# Patient Record
Sex: FEMALE | Race: WHITE | NOT HISPANIC OR LATINO | Employment: FULL TIME | URBAN - METROPOLITAN AREA
[De-identification: names, ages, dates, MRNs, and addresses within clinical notes are randomized per-mention and may not be internally consistent; named-entity substitution may affect disease eponyms.]

---

## 2018-04-09 ENCOUNTER — OFFICE VISIT CONVERTED (OUTPATIENT)
Dept: GASTROENTEROLOGY | Facility: CLINIC | Age: 68
End: 2018-04-09
Attending: NURSE PRACTITIONER

## 2019-03-13 ENCOUNTER — HOSPITAL ENCOUNTER (OUTPATIENT)
Dept: URGENT CARE | Facility: CLINIC | Age: 69
Discharge: HOME | End: 2019-03-13

## 2019-09-30 ENCOUNTER — OFFICE VISIT CONVERTED (OUTPATIENT)
Dept: GASTROENTEROLOGY | Facility: CLINIC | Age: 69
End: 2019-09-30
Attending: NURSE PRACTITIONER

## 2020-02-04 ENCOUNTER — HOSPITAL ENCOUNTER (OUTPATIENT)
Dept: OTHER | Facility: HOSPITAL | Age: 70
Discharge: HOME | End: 2020-02-04
Attending: SPECIALIST

## 2020-02-04 LAB
ALBUMIN SERPL-MCNC: 4.4 G/DL (ref 3.5–5)
ALBUMIN/GLOB SERPL: 1.5 {RATIO} (ref 1.4–2.6)
ALP SERPL-CCNC: 61 U/L (ref 43–160)
ALT SERPL-CCNC: 13 U/L (ref 10–40)
ANION GAP SERPL CALC-SCNC: 23 MMOL/L (ref 8–19)
AST SERPL-CCNC: 19 U/L (ref 15–50)
BILIRUB SERPL-MCNC: 0.32 MG/DL (ref 0.2–1.3)
BUN SERPL-MCNC: 18 MG/DL (ref 5–25)
BUN/CREAT SERPL: 20 {RATIO} (ref 6–20)
CALCIUM SERPL-MCNC: 9.8 MG/DL (ref 8.7–10.4)
CHLORIDE SERPL-SCNC: 101 MMOL/L (ref 99–111)
CHOLEST SERPL-MCNC: 138 MG/DL (ref 107–200)
CHOLEST/HDLC SERPL: 2.5 {RATIO} (ref 3–6)
CONV CO2: 22 MMOL/L (ref 22–32)
CONV TOTAL PROTEIN: 7.3 G/DL (ref 6.3–8.2)
CREAT UR-MCNC: 0.88 MG/DL (ref 0.5–0.9)
GFR SERPLBLD BASED ON 1.73 SQ M-ARVRAT: >60 ML/MIN/{1.73_M2}
GLOBULIN UR ELPH-MCNC: 2.9 G/DL (ref 2–3.5)
GLUCOSE SERPL-MCNC: 91 MG/DL (ref 65–99)
HDLC SERPL-MCNC: 55 MG/DL (ref 40–60)
LDLC SERPL CALC-MCNC: 67 MG/DL (ref 70–100)
OSMOLALITY SERPL CALC.SUM OF ELEC: 295 MOSM/KG (ref 273–304)
POTASSIUM SERPL-SCNC: 4.1 MMOL/L (ref 3.5–5.3)
SODIUM SERPL-SCNC: 142 MMOL/L (ref 135–147)
TRIGL SERPL-MCNC: 80 MG/DL (ref 40–150)
VLDLC SERPL-MCNC: 16 MG/DL (ref 5–37)

## 2020-02-05 ENCOUNTER — HOSPITAL ENCOUNTER (OUTPATIENT)
Dept: URGENT CARE | Facility: CLINIC | Age: 70
Discharge: HOME | End: 2020-02-05

## 2020-10-19 ENCOUNTER — OFFICE VISIT CONVERTED (OUTPATIENT)
Dept: GASTROENTEROLOGY | Facility: CLINIC | Age: 70
End: 2020-10-19
Attending: NURSE PRACTITIONER

## 2020-10-20 ENCOUNTER — HOSPITAL ENCOUNTER (OUTPATIENT)
Dept: OTHER | Facility: HOSPITAL | Age: 70
Discharge: HOME | End: 2020-10-20
Attending: NURSE PRACTITIONER

## 2020-10-20 LAB
C DIFF TOX B STL QL CT TISS CULT: NEGATIVE
CONV 027 TOXIN: NEGATIVE

## 2020-10-22 LAB — BACTERIA SPEC AEROBE CULT: NORMAL

## 2021-05-13 NOTE — PROGRESS NOTES
Progress Note      Patient Name: Dorie Head   Patient ID: [id number]   Sex: Female   YOB: 1950    Primary Care Provider: Kelly LEIVA   Referring Provider: Kelly LEIVA    Visit Date: October 19, 2020    Provider: ANNE Tolentino   Location: [facility]   Location Address: [address] [place] [address], [address]  [place], [place]  [id number]   Location Phone: [phone]          Chief Complaint  · Follow Up Diarrhea      History Of Present Illness     Ms. Head presents for f/u of diarrhrea and collagenous colitis.      She was last evaluated in September, 2019 and prescribed budesonide taper.  She reports that budesonide taper was effective for diarrhea.  However, after she completed taper diarrhea returned approximately 2 weeks later.    She states that watery diarrhea has been severe for the past 1 month.  She admits nausea and weakness and states that she is often missing work due to weakness.  Reports up to 10 watery bowel movements per day.  Denies rectal bleeding.  Admits intermittent abdominal pain that typically only last for a few minutes.               Past Medical History  Basal Cell Carcinoma; Cancer; COPD; High blood pressure; High cholesterol; Squamous cell carcinoma         Past Surgical History  Colonoscopy; Fallopian tube dilation; Hysterectomy; Mole Removal; Tubal ligation         Medication List  amlodipine 10 mg oral tablet; aspirin 81 mg oral tablet,delayed release (DR/EC); carvedilol 6.25 mg oral tablet; hydrochlorothiazide 25 mg oral tablet; lisinopril 40 mg oral tablet; pravastatin 40 mg oral tablet; Zetia 10 mg oral tablet         Allergy List  NO KNOWN DRUG ALLERGIES       Allergies Reconciled  Family Medical History  Diabetes, unspecified type; Ovarian Cancer, Family History         Social History  Alcohol (Never); Caffeine (Current every day); Tobacco (Current every day)         Review of  "Systems  · Constitutional  o Denies  o : chills, fever  · Cardiovascular  o Denies  o : chest pain, irregular heart beats  · Respiratory  o Denies  o : cough, shortness of breath  · Gastrointestinal  o Admits  o : see HPI   · Endocrine  o Denies  o : weight gain, weight loss      Vitals  Date Time BP Position Site L\R Cuff Size HR RR TEMP (F) WT  HT  BMI kg/m2 BSA m2 O2 Sat FR L/min FiO2 HC       10/19/2020 09:09 /74 Sitting      98.4 111lbs 16oz 5'  9\" 16.54 1.57             Physical Examination  · Constitutional  o Appearance  o : thin, well developed, no obvious deformities present  · Head and Face  o Head  o : Normocephalic with no worriesome skin lesions  · Eyes  o Vision  o :   § Visual Fields  § : eyes move symmetrical in all directions  o Sclerae  o : sclerae anicteric  o Pupils and Irises  o : pupils equal and symmetrical  · Neck  o Inspection/Palpation  o : Trachea is midline, no adenopathy  · Respiratory  o Respiratory Effort  o : Breathing is unlabored.  o Inspection of Chest  o : normal appearance  o Auscultation of Lungs  o : Chest is clear to auscultation bilaterally.  · Cardiovascular  o Heart  o :   § Auscultation of Heart  § : no murmurs, rubs, or gallops  o Peripheral Vascular System  o :   § Extremities  § : no cyanosis, clubbing or edema;   · Gastrointestinal  o Abdominal Examination  o : Abdomen is soft, nontender to palpation, with normal active bowel sounds, no appreciable hepatosplenomegaly.  o Digital Rectal Exam  o : deferred  · Skin and Subcutaneous Tissue  o General Inspection  o : without focal lesions; turgor is normal  · Psychiatric  o General  o : Alert and oriented x3  o Mood and Affect  o : Mood and affect are appropriate to circumstances  · Extremities  o Extremities  o : No edema, no cyanosis          Assessment  · Diarrhea     787.91/R19.7  · Nausea     787.02/R11.0  · Collagenous colitis     558.9/K52.831      Plan  · Orders  o C difficile Toxigenic Assay (PCR) [facility] " (93348) - - 10/19/2020  o Lactoferrin (Fecal) Qualitative (63263) - - 10/19/2020  o Stool culture and sensitivity (90486) - - 10/19/2020  · Medications  o ondansetron 8 mg oral tablet,disintegrating   SIG: dissolve 1 tablet by oral route every 6 hours as needed nausea   DISP: (20) Tablet with 0 refills  Prescribed on 10/19/2020     o Medications have been Reconciled  o Transition of Care or Provider Policy  · Instructions  o Information given on current diagnoses. Await stool studies. If c diff negative, will treat with budesonide taper.   · Disposition  o Follow up 2 months            Electronically Signed by: ANNE Tolentino -Author on October 19, 2020 10:33:39 AM

## 2021-05-14 VITALS
WEIGHT: 112 LBS | SYSTOLIC BLOOD PRESSURE: 113 MMHG | HEIGHT: 69 IN | TEMPERATURE: 98.4 F | BODY MASS INDEX: 16.59 KG/M2 | DIASTOLIC BLOOD PRESSURE: 74 MMHG

## 2021-05-15 VITALS
WEIGHT: 116.37 LBS | SYSTOLIC BLOOD PRESSURE: 144 MMHG | BODY MASS INDEX: 17.23 KG/M2 | HEIGHT: 69 IN | DIASTOLIC BLOOD PRESSURE: 52 MMHG

## 2021-05-16 VITALS
BODY MASS INDEX: 17.77 KG/M2 | HEIGHT: 69 IN | SYSTOLIC BLOOD PRESSURE: 143 MMHG | WEIGHT: 120 LBS | DIASTOLIC BLOOD PRESSURE: 61 MMHG

## 2021-07-14 ENCOUNTER — TRANSCRIBE ORDERS (OUTPATIENT)
Dept: ADMINISTRATIVE | Facility: HOSPITAL | Age: 71
End: 2021-07-14

## 2021-07-14 ENCOUNTER — LAB (OUTPATIENT)
Dept: LAB | Facility: HOSPITAL | Age: 71
End: 2021-07-14

## 2021-07-14 DIAGNOSIS — I10 HYPERTENSION, UNSPECIFIED TYPE: Primary | ICD-10-CM

## 2021-07-14 DIAGNOSIS — E78.5 HYPERLIPIDEMIA, UNSPECIFIED HYPERLIPIDEMIA TYPE: ICD-10-CM

## 2021-07-14 DIAGNOSIS — I10 HYPERTENSION, UNSPECIFIED TYPE: ICD-10-CM

## 2021-07-14 LAB
ALBUMIN SERPL-MCNC: 4.3 G/DL (ref 3.5–5.2)
ALBUMIN/GLOB SERPL: 1.4 G/DL
ALP SERPL-CCNC: 73 U/L (ref 39–117)
ALT SERPL W P-5'-P-CCNC: 12 U/L (ref 1–33)
ANION GAP SERPL CALCULATED.3IONS-SCNC: 12 MMOL/L (ref 5–15)
AST SERPL-CCNC: 23 U/L (ref 1–32)
BILIRUB SERPL-MCNC: 0.3 MG/DL (ref 0–1.2)
BUN SERPL-MCNC: 19 MG/DL (ref 8–23)
BUN/CREAT SERPL: 19 (ref 7–25)
CALCIUM SPEC-SCNC: 9.7 MG/DL (ref 8.6–10.5)
CHLORIDE SERPL-SCNC: 100 MMOL/L (ref 98–107)
CHOLEST SERPL-MCNC: 143 MG/DL (ref 0–200)
CO2 SERPL-SCNC: 25 MMOL/L (ref 22–29)
CREAT SERPL-MCNC: 1 MG/DL (ref 0.57–1)
GFR SERPL CREATININE-BSD FRML MDRD: 55 ML/MIN/1.73
GLOBULIN UR ELPH-MCNC: 3 GM/DL
GLUCOSE SERPL-MCNC: 77 MG/DL (ref 65–99)
HDLC SERPL-MCNC: 51 MG/DL (ref 40–60)
LDLC SERPL CALC-MCNC: 74 MG/DL (ref 0–100)
LDLC/HDLC SERPL: 1.42 {RATIO}
POTASSIUM SERPL-SCNC: 4 MMOL/L (ref 3.5–5.2)
PROT SERPL-MCNC: 7.3 G/DL (ref 6–8.5)
SODIUM SERPL-SCNC: 137 MMOL/L (ref 136–145)
TRIGL SERPL-MCNC: 97 MG/DL (ref 0–150)
VLDLC SERPL-MCNC: 18 MG/DL (ref 5–40)

## 2021-07-14 PROCEDURE — 80053 COMPREHEN METABOLIC PANEL: CPT

## 2021-07-14 PROCEDURE — 80061 LIPID PANEL: CPT

## 2021-07-14 PROCEDURE — 36415 COLL VENOUS BLD VENIPUNCTURE: CPT

## 2021-10-18 ENCOUNTER — OFFICE VISIT (OUTPATIENT)
Dept: GASTROENTEROLOGY | Facility: CLINIC | Age: 71
End: 2021-10-18

## 2021-10-18 VITALS
BODY MASS INDEX: 17.51 KG/M2 | WEIGHT: 118.2 LBS | SYSTOLIC BLOOD PRESSURE: 161 MMHG | DIASTOLIC BLOOD PRESSURE: 67 MMHG | HEART RATE: 74 BPM | HEIGHT: 69 IN

## 2021-10-18 DIAGNOSIS — R19.7 DIARRHEA, UNSPECIFIED TYPE: ICD-10-CM

## 2021-10-18 DIAGNOSIS — R11.0 NAUSEA: ICD-10-CM

## 2021-10-18 DIAGNOSIS — K52.831 COLLAGENOUS COLITIS: Primary | ICD-10-CM

## 2021-10-18 PROBLEM — I10 HIGH BLOOD PRESSURE: Status: ACTIVE | Noted: 2021-10-18

## 2021-10-18 PROBLEM — E78.00 HIGH CHOLESTEROL: Status: ACTIVE | Noted: 2021-10-18

## 2021-10-18 PROCEDURE — 99212 OFFICE O/P EST SF 10 MIN: CPT | Performed by: NURSE PRACTITIONER

## 2021-10-18 RX ORDER — AMLODIPINE BESYLATE 10 MG/1
TABLET ORAL
COMMUNITY
Start: 2021-09-13 | End: 2022-02-02 | Stop reason: HOSPADM

## 2021-10-18 RX ORDER — EZETIMIBE 10 MG/1
TABLET ORAL
COMMUNITY
Start: 2021-09-13 | End: 2023-03-14 | Stop reason: SDUPTHER

## 2021-10-18 RX ORDER — LISINOPRIL 40 MG/1
40 TABLET ORAL DAILY
COMMUNITY
End: 2022-02-02 | Stop reason: HOSPADM

## 2021-10-18 RX ORDER — BUDESONIDE 3 MG/1
CAPSULE, COATED PELLETS ORAL
Qty: 180 CAPSULE | Refills: 5 | Status: SHIPPED | OUTPATIENT
Start: 2021-10-18 | End: 2022-01-16

## 2021-10-18 RX ORDER — ASPIRIN 81 MG/1
81 TABLET ORAL DAILY
COMMUNITY
End: 2023-03-14 | Stop reason: SDUPTHER

## 2021-10-18 RX ORDER — CARVEDILOL 12.5 MG/1
TABLET ORAL
COMMUNITY
Start: 2021-09-13 | End: 2022-02-02 | Stop reason: HOSPADM

## 2021-10-18 RX ORDER — PRAVASTATIN SODIUM 40 MG
40 TABLET ORAL DAILY
COMMUNITY
End: 2023-03-14 | Stop reason: SDUPTHER

## 2021-10-18 RX ORDER — HYDROCHLOROTHIAZIDE 25 MG/1
25 TABLET ORAL DAILY
COMMUNITY
End: 2022-02-02 | Stop reason: HOSPADM

## 2021-10-18 NOTE — PROGRESS NOTES
Chief Complaint  colitis follow up    Dorie Head is a 70 y.o. female who presents to [facility] for follow-up of collagenous colitis, diarrhea and nausea.      History of present Illness  States for the past one year, she's felt really sick.  When she has a bowel movement, feels nauseous before bowel movement.  States that she will often have mucous in stools.  If she goes down to one budesonide, has watery stools.      Admits nausea that's worse in the mornings.  This can also occur sporadically throughout the day.  When present, it lasts for about 1-2 hours.  She's unable to stand due to nausea.  Denies vomiting.      Denies heartburn and dysphagia.          Past Medical History:   Diagnosis Date   • Basal cell carcinoma    • Cancer (HCC)    • Cancer (HCC)     squamos cell carcinoma   • COPD (chronic obstructive pulmonary disease) (HCC)    • High blood pressure    • High cholesterol        Past Surgical History:   Procedure Laterality Date   • COLONOSCOPY  2017   • HYSTERECTOMY     • MOLE REMOVAL     • OTHER SURGICAL HISTORY      fallopian tube dilation   • TUBAL ABDOMINAL LIGATION           Current Outpatient Medications:   •  amLODIPine (NORVASC) 10 MG tablet, , Disp: , Rfl:   •  aspirin (aspirin) 81 MG EC tablet, aspirin 81 mg oral tablet,delayed release (DR/EC) take 1 tablet (81 mg) by oral route once daily   Active, Disp: , Rfl:   •  carvedilol (COREG) 12.5 MG tablet, , Disp: , Rfl:   •  Cholecalciferol 50 MCG (2000 UT) capsule, Vitamin D3 2,000 unit oral capsule take 1 capsule by oral route daily   Suspended, Disp: , Rfl:   •  ezetimibe (ZETIA) 10 MG tablet, , Disp: , Rfl:   •  hydroCHLOROthiazide (HYDRODIURIL) 25 MG tablet, hydrochlorothiazide 25 mg oral tablet take 1 tablet (25 mg) by oral route once daily   Active, Disp: , Rfl:   •  lisinopril (PRINIVIL,ZESTRIL) 40 MG tablet, lisinopril 40 mg oral tablet take 1 tablet (40 mg) by oral route once daily   Active, Disp: ,  "Rfl:   •  pravastatin (PRAVACHOL) 40 MG tablet, pravastatin 40 mg oral tablet take 1 tablet (40 mg) by oral route once daily   Active, Disp: , Rfl:   •  Budesonide (Entocort EC) 3 MG 24 hr capsule, Take 3 capsules by mouth Every Morning for 30 days, THEN 2 capsules Every Morning for 30 days, THEN 1 capsule Every Morning for 30 days., Disp: 180 capsule, Rfl: 5     No Known Allergies    Family History   Problem Relation Age of Onset   • Diabetes Mother    • Ovarian cancer Sister         Social History     Social History Narrative   • Not on file       Objective     Review of Systems   Constitutional: Negative for fever and unexpected weight loss.   Respiratory: Negative for cough and shortness of breath.    Cardiovascular: Negative for chest pain and palpitations.   Gastrointestinal:        See HPI   Neurological: Negative for weakness and confusion.        Vital Signs:   /67 (BP Location: Left arm, Patient Position: Sitting, Cuff Size: Adult)   Pulse 74   Ht 175.3 cm (69\")   Wt 53.6 kg (118 lb 3.2 oz)   BMI 17.46 kg/m²       Physical Exam  Constitutional:       General: She is not in acute distress.     Appearance: Normal appearance. She is well-developed and normal weight.   HENT:      Head: Normocephalic and atraumatic.   Eyes:      Conjunctiva/sclera: Conjunctivae normal.      Pupils: Pupils are equal, round, and reactive to light.      Visual Fields: Right eye visual fields normal and left eye visual fields normal.   Cardiovascular:      Rate and Rhythm: Normal rate and regular rhythm.      Heart sounds: Normal heart sounds.   Pulmonary:      Effort: Pulmonary effort is normal. No retractions.      Breath sounds: Normal breath sounds and air entry.   Abdominal:      General: Bowel sounds are normal.      Palpations: Abdomen is soft.      Tenderness: There is no abdominal tenderness.      Comments: No appreciable hepatosplenomegaly or ascites   Musculoskeletal:         General: Normal range of motion.    "   Cervical back: Neck supple.      Right lower leg: No edema.      Left lower leg: No edema.   Lymphadenopathy:      Cervical: No cervical adenopathy.   Skin:     General: Skin is warm and dry.      Findings: No lesion.   Neurological:      General: No focal deficit present.      Mental Status: She is alert and oriented to person, place, and time.   Psychiatric:         Mood and Affect: Mood and affect normal.         Behavior: Behavior normal.         Result Review :                   Assessment and Plan    Diagnoses and all orders for this visit:    1. Collagenous colitis (Primary)  -     Budesonide (Entocort EC) 3 MG 24 hr capsule; Take 3 capsules by mouth Every Morning for 30 days, THEN 2 capsules Every Morning for 30 days, THEN 1 capsule Every Morning for 30 days.  Dispense: 180 capsule; Refill: 5    2. Diarrhea, unspecified type    3. Nausea            Follow Up   Return in about 6 months (around 4/18/2022) for collagenous colitis.  Patient was given instructions and counseling regarding her condition or for health maintenance advice. Please see specific information pulled into the AVS if appropriate.

## 2022-01-31 LAB
ALBUMIN SERPL-MCNC: 4.3 G/DL (ref 3.5–5.2)
ALBUMIN/GLOB SERPL: 1.4 G/DL
ALP SERPL-CCNC: 68 U/L (ref 39–117)
ALT SERPL W P-5'-P-CCNC: 7 U/L (ref 1–33)
ANION GAP SERPL CALCULATED.3IONS-SCNC: 11.6 MMOL/L (ref 5–15)
AST SERPL-CCNC: 12 U/L (ref 1–32)
BASOPHILS # BLD AUTO: 0.05 10*3/MM3 (ref 0–0.2)
BASOPHILS NFR BLD AUTO: 0.7 % (ref 0–1.5)
BILIRUB SERPL-MCNC: 0.4 MG/DL (ref 0–1.2)
BUN SERPL-MCNC: 20 MG/DL (ref 8–23)
BUN/CREAT SERPL: 17.9 (ref 7–25)
CALCIUM SPEC-SCNC: 9.8 MG/DL (ref 8.6–10.5)
CHLORIDE SERPL-SCNC: 95 MMOL/L (ref 98–107)
CO2 SERPL-SCNC: 28.4 MMOL/L (ref 22–29)
CREAT SERPL-MCNC: 1.12 MG/DL (ref 0.57–1)
DEPRECATED RDW RBC AUTO: 41.5 FL (ref 37–54)
EOSINOPHIL # BLD AUTO: 0.23 10*3/MM3 (ref 0–0.4)
EOSINOPHIL NFR BLD AUTO: 3 % (ref 0.3–6.2)
ERYTHROCYTE [DISTWIDTH] IN BLOOD BY AUTOMATED COUNT: 11.9 % (ref 12.3–15.4)
GFR SERPL CREATININE-BSD FRML MDRD: 48 ML/MIN/1.73
GLOBULIN UR ELPH-MCNC: 3.1 GM/DL
GLUCOSE SERPL-MCNC: 127 MG/DL (ref 65–99)
HCT VFR BLD AUTO: 37.9 % (ref 34–46.6)
HGB BLD-MCNC: 13.7 G/DL (ref 12–15.9)
HOLD SPECIMEN: NORMAL
HOLD SPECIMEN: NORMAL
IMM GRANULOCYTES # BLD AUTO: 0.01 10*3/MM3 (ref 0–0.05)
IMM GRANULOCYTES NFR BLD AUTO: 0.1 % (ref 0–0.5)
LYMPHOCYTES # BLD AUTO: 1.54 10*3/MM3 (ref 0.7–3.1)
LYMPHOCYTES NFR BLD AUTO: 20.2 % (ref 19.6–45.3)
MAGNESIUM SERPL-MCNC: 1.8 MG/DL (ref 1.6–2.4)
MCH RBC QN AUTO: 34.3 PG (ref 26.6–33)
MCHC RBC AUTO-ENTMCNC: 36.1 G/DL (ref 31.5–35.7)
MCV RBC AUTO: 95 FL (ref 79–97)
MONOCYTES # BLD AUTO: 0.52 10*3/MM3 (ref 0.1–0.9)
MONOCYTES NFR BLD AUTO: 6.8 % (ref 5–12)
NEUTROPHILS NFR BLD AUTO: 5.28 10*3/MM3 (ref 1.7–7)
NEUTROPHILS NFR BLD AUTO: 69.2 % (ref 42.7–76)
NRBC BLD AUTO-RTO: 0 /100 WBC (ref 0–0.2)
PLATELET # BLD AUTO: 276 10*3/MM3 (ref 140–450)
PMV BLD AUTO: 9.6 FL (ref 6–12)
POTASSIUM SERPL-SCNC: 3.4 MMOL/L (ref 3.5–5.2)
PROT SERPL-MCNC: 7.4 G/DL (ref 6–8.5)
RBC # BLD AUTO: 3.99 10*6/MM3 (ref 3.77–5.28)
SODIUM SERPL-SCNC: 135 MMOL/L (ref 136–145)
TROPONIN T SERPL-MCNC: <0.01 NG/ML (ref 0–0.03)
WBC NRBC COR # BLD: 7.63 10*3/MM3 (ref 3.4–10.8)
WHOLE BLOOD HOLD SPECIMEN: NORMAL
WHOLE BLOOD HOLD SPECIMEN: NORMAL

## 2022-01-31 PROCEDURE — 85025 COMPLETE CBC W/AUTO DIFF WBC: CPT

## 2022-01-31 PROCEDURE — 80053 COMPREHEN METABOLIC PANEL: CPT

## 2022-01-31 PROCEDURE — 83735 ASSAY OF MAGNESIUM: CPT

## 2022-01-31 PROCEDURE — C9803 HOPD COVID-19 SPEC COLLECT: HCPCS

## 2022-01-31 PROCEDURE — 93005 ELECTROCARDIOGRAM TRACING: CPT

## 2022-01-31 PROCEDURE — U0004 COV-19 TEST NON-CDC HGH THRU: HCPCS

## 2022-01-31 PROCEDURE — 84484 ASSAY OF TROPONIN QUANT: CPT

## 2022-01-31 PROCEDURE — 99285 EMERGENCY DEPT VISIT HI MDM: CPT

## 2022-01-31 PROCEDURE — G0378 HOSPITAL OBSERVATION PER HR: HCPCS

## 2022-01-31 PROCEDURE — 93010 ELECTROCARDIOGRAM REPORT: CPT | Performed by: INTERNAL MEDICINE

## 2022-01-31 PROCEDURE — 93005 ELECTROCARDIOGRAM TRACING: CPT | Performed by: EMERGENCY MEDICINE

## 2022-01-31 PROCEDURE — 36415 COLL VENOUS BLD VENIPUNCTURE: CPT

## 2022-01-31 RX ORDER — SODIUM CHLORIDE 0.9 % (FLUSH) 0.9 %
10 SYRINGE (ML) INJECTION AS NEEDED
Status: DISCONTINUED | OUTPATIENT
Start: 2022-01-31 | End: 2022-02-02 | Stop reason: HOSPADM

## 2022-02-01 ENCOUNTER — APPOINTMENT (OUTPATIENT)
Dept: GENERAL RADIOLOGY | Facility: HOSPITAL | Age: 72
End: 2022-02-01

## 2022-02-01 ENCOUNTER — APPOINTMENT (OUTPATIENT)
Dept: CT IMAGING | Facility: HOSPITAL | Age: 72
End: 2022-02-01

## 2022-02-01 ENCOUNTER — APPOINTMENT (OUTPATIENT)
Dept: CARDIOLOGY | Facility: HOSPITAL | Age: 72
End: 2022-02-01

## 2022-02-01 ENCOUNTER — HOSPITAL ENCOUNTER (OUTPATIENT)
Facility: HOSPITAL | Age: 72
Setting detail: OBSERVATION
Discharge: HOME | End: 2022-02-02
Attending: EMERGENCY MEDICINE | Admitting: FAMILY MEDICINE

## 2022-02-01 DIAGNOSIS — U07.1 COVID-19: Primary | ICD-10-CM

## 2022-02-01 DIAGNOSIS — I20.0 UNSTABLE ANGINA: ICD-10-CM

## 2022-02-01 PROBLEM — R07.9 CHEST PAIN AT REST: Status: ACTIVE | Noted: 2022-02-01

## 2022-02-01 LAB
ANION GAP SERPL CALCULATED.3IONS-SCNC: 13 MMOL/L (ref 5–15)
BACTERIA UR QL AUTO: ABNORMAL /HPF
BILIRUB UR QL STRIP: NEGATIVE
BUN SERPL-MCNC: 20 MG/DL (ref 8–23)
BUN/CREAT SERPL: 21.7 (ref 7–25)
CALCIUM SPEC-SCNC: 9.9 MG/DL (ref 8.6–10.5)
CHLORIDE SERPL-SCNC: 96 MMOL/L (ref 98–107)
CHOLEST SERPL-MCNC: 120 MG/DL (ref 0–200)
CLARITY UR: CLEAR
CO2 SERPL-SCNC: 27 MMOL/L (ref 22–29)
COLOR UR: YELLOW
CREAT SERPL-MCNC: 0.92 MG/DL (ref 0.57–1)
CRP SERPL-MCNC: 0.51 MG/DL (ref 0–0.5)
D DIMER PPP FEU-MCNC: 0.46 MG/L (FEU) (ref 0–0.59)
GFR SERPL CREATININE-BSD FRML MDRD: 61 ML/MIN/1.73
GLUCOSE SERPL-MCNC: 125 MG/DL (ref 65–99)
GLUCOSE UR STRIP-MCNC: NEGATIVE MG/DL
HBA1C MFR BLD: 5.41 % (ref 4.8–5.6)
HDLC SERPL-MCNC: 41 MG/DL (ref 40–60)
HGB UR QL STRIP.AUTO: ABNORMAL
HYALINE CASTS UR QL AUTO: ABNORMAL /LPF
KETONES UR QL STRIP: NEGATIVE
LDLC SERPL CALC-MCNC: 59 MG/DL (ref 0–100)
LDLC/HDLC SERPL: 1.4 {RATIO}
LEUKOCYTE ESTERASE UR QL STRIP.AUTO: NEGATIVE
NITRITE UR QL STRIP: NEGATIVE
PH UR STRIP.AUTO: <=5 [PH] (ref 5–8)
POTASSIUM SERPL-SCNC: 3.3 MMOL/L (ref 3.5–5.2)
PROT UR QL STRIP: ABNORMAL
QT INTERVAL: 417 MS
RBC # UR STRIP: ABNORMAL /HPF
REF LAB TEST METHOD: ABNORMAL
SARS-COV-2 RNA PNL SPEC NAA+PROBE: DETECTED
SODIUM SERPL-SCNC: 136 MMOL/L (ref 136–145)
SP GR UR STRIP: 1.01 (ref 1–1.03)
SQUAMOUS #/AREA URNS HPF: ABNORMAL /HPF
TRIGL SERPL-MCNC: 108 MG/DL (ref 0–150)
TROPONIN T SERPL-MCNC: <0.01 NG/ML (ref 0–0.03)
UROBILINOGEN UR QL STRIP: ABNORMAL
VLDLC SERPL-MCNC: 20 MG/DL (ref 5–40)
WBC # UR STRIP: ABNORMAL /HPF

## 2022-02-01 PROCEDURE — 96366 THER/PROPH/DIAG IV INF ADDON: CPT

## 2022-02-01 PROCEDURE — 99220 PR INITIAL OBSERVATION CARE/DAY 70 MINUTES: CPT | Performed by: FAMILY MEDICINE

## 2022-02-01 PROCEDURE — 71045 X-RAY EXAM CHEST 1 VIEW: CPT

## 2022-02-01 PROCEDURE — G0378 HOSPITAL OBSERVATION PER HR: HCPCS

## 2022-02-01 PROCEDURE — 81001 URINALYSIS AUTO W/SCOPE: CPT | Performed by: EMERGENCY MEDICINE

## 2022-02-01 PROCEDURE — 72125 CT NECK SPINE W/O DYE: CPT

## 2022-02-01 PROCEDURE — 85379 FIBRIN DEGRADATION QUANT: CPT | Performed by: FAMILY MEDICINE

## 2022-02-01 PROCEDURE — 96372 THER/PROPH/DIAG INJ SC/IM: CPT

## 2022-02-01 PROCEDURE — 83036 HEMOGLOBIN GLYCOSYLATED A1C: CPT | Performed by: FAMILY MEDICINE

## 2022-02-01 PROCEDURE — 96365 THER/PROPH/DIAG IV INF INIT: CPT

## 2022-02-01 PROCEDURE — 25010000002 ONDANSETRON PER 1 MG: Performed by: FAMILY MEDICINE

## 2022-02-01 PROCEDURE — 93325 DOPPLER ECHO COLOR FLOW MAPG: CPT

## 2022-02-01 PROCEDURE — 93308 TTE F-UP OR LMTD: CPT

## 2022-02-01 PROCEDURE — 70450 CT HEAD/BRAIN W/O DYE: CPT

## 2022-02-01 PROCEDURE — 86140 C-REACTIVE PROTEIN: CPT | Performed by: FAMILY MEDICINE

## 2022-02-01 PROCEDURE — 80048 BASIC METABOLIC PNL TOTAL CA: CPT | Performed by: FAMILY MEDICINE

## 2022-02-01 PROCEDURE — 96375 TX/PRO/DX INJ NEW DRUG ADDON: CPT

## 2022-02-01 PROCEDURE — 93321 DOPPLER ECHO F-UP/LMTD STD: CPT

## 2022-02-01 PROCEDURE — 84484 ASSAY OF TROPONIN QUANT: CPT | Performed by: FAMILY MEDICINE

## 2022-02-01 PROCEDURE — 80061 LIPID PANEL: CPT | Performed by: FAMILY MEDICINE

## 2022-02-01 PROCEDURE — 25010000002 ENOXAPARIN PER 10 MG: Performed by: FAMILY MEDICINE

## 2022-02-01 PROCEDURE — 25010000002 MAGNESIUM SULFATE IN D5W 1G/100ML (PREMIX) 1-5 GM/100ML-% SOLUTION: Performed by: FAMILY MEDICINE

## 2022-02-01 RX ORDER — BUDESONIDE 3 MG/1
6 CAPSULE, COATED PELLETS ORAL 2 TIMES DAILY
COMMUNITY
End: 2022-07-25 | Stop reason: ALTCHOICE

## 2022-02-01 RX ORDER — ACETAMINOPHEN 325 MG/1
650 TABLET ORAL EVERY 4 HOURS PRN
Status: DISCONTINUED | OUTPATIENT
Start: 2022-02-01 | End: 2022-02-02 | Stop reason: HOSPADM

## 2022-02-01 RX ORDER — SODIUM CHLORIDE 0.9 % (FLUSH) 0.9 %
10 SYRINGE (ML) INJECTION EVERY 12 HOURS SCHEDULED
Status: DISCONTINUED | OUTPATIENT
Start: 2022-02-01 | End: 2022-02-02 | Stop reason: HOSPADM

## 2022-02-01 RX ORDER — ONDANSETRON 2 MG/ML
4 INJECTION INTRAMUSCULAR; INTRAVENOUS EVERY 6 HOURS PRN
Status: DISCONTINUED | OUTPATIENT
Start: 2022-02-01 | End: 2022-02-02 | Stop reason: HOSPADM

## 2022-02-01 RX ORDER — ASPIRIN 81 MG/1
324 TABLET, CHEWABLE ORAL ONCE
Status: COMPLETED | OUTPATIENT
Start: 2022-02-01 | End: 2022-02-01

## 2022-02-01 RX ORDER — SODIUM CHLORIDE 0.9 % (FLUSH) 0.9 %
10 SYRINGE (ML) INJECTION AS NEEDED
Status: DISCONTINUED | OUTPATIENT
Start: 2022-02-01 | End: 2022-02-02 | Stop reason: HOSPADM

## 2022-02-01 RX ORDER — ATORVASTATIN CALCIUM 40 MG/1
40 TABLET, FILM COATED ORAL NIGHTLY
Status: DISCONTINUED | OUTPATIENT
Start: 2022-02-01 | End: 2022-02-02 | Stop reason: HOSPADM

## 2022-02-01 RX ORDER — POTASSIUM CHLORIDE 750 MG/1
20 CAPSULE, EXTENDED RELEASE ORAL
Status: COMPLETED | OUTPATIENT
Start: 2022-02-01 | End: 2022-02-01

## 2022-02-01 RX ORDER — ASPIRIN 81 MG/1
81 TABLET ORAL DAILY
Status: DISCONTINUED | OUTPATIENT
Start: 2022-02-02 | End: 2022-02-02 | Stop reason: HOSPADM

## 2022-02-01 RX ORDER — NITROGLYCERIN 0.4 MG/1
0.4 TABLET SUBLINGUAL
Status: DISCONTINUED | OUTPATIENT
Start: 2022-02-01 | End: 2022-02-02 | Stop reason: HOSPADM

## 2022-02-01 RX ORDER — MAGNESIUM SULFATE 1 G/100ML
1 INJECTION INTRAVENOUS ONCE
Status: COMPLETED | OUTPATIENT
Start: 2022-02-01 | End: 2022-02-01

## 2022-02-01 RX ORDER — POLYETHYLENE GLYCOL 3350 17 G
2 POWDER IN PACKET (EA) ORAL
Status: DISCONTINUED | OUTPATIENT
Start: 2022-02-01 | End: 2022-02-02 | Stop reason: HOSPADM

## 2022-02-01 RX ADMIN — SODIUM CHLORIDE, PRESERVATIVE FREE 10 ML: 5 INJECTION INTRAVENOUS at 20:56

## 2022-02-01 RX ADMIN — ASPIRIN 81 MG CHEWABLE TABLET 324 MG: 81 TABLET CHEWABLE at 10:24

## 2022-02-01 RX ADMIN — POTASSIUM CHLORIDE 20 MEQ: 10 CAPSULE, COATED, EXTENDED RELEASE ORAL at 12:51

## 2022-02-01 RX ADMIN — SODIUM CHLORIDE, PRESERVATIVE FREE 10 ML: 5 INJECTION INTRAVENOUS at 11:32

## 2022-02-01 RX ADMIN — POTASSIUM CHLORIDE 20 MEQ: 10 CAPSULE, COATED, EXTENDED RELEASE ORAL at 17:30

## 2022-02-01 RX ADMIN — ONDANSETRON 4 MG: 2 INJECTION INTRAMUSCULAR; INTRAVENOUS at 15:45

## 2022-02-01 RX ADMIN — NITROGLYCERIN 0.4 MG: 0.4 TABLET SUBLINGUAL at 17:52

## 2022-02-01 RX ADMIN — MAGNESIUM SULFATE 1 G: 1 INJECTION INTRAVENOUS at 10:01

## 2022-02-01 RX ADMIN — POTASSIUM CHLORIDE 20 MEQ: 10 CAPSULE, COATED, EXTENDED RELEASE ORAL at 10:01

## 2022-02-01 RX ADMIN — ENOXAPARIN SODIUM 40 MG: 40 INJECTION SUBCUTANEOUS at 10:05

## 2022-02-01 RX ADMIN — ATORVASTATIN CALCIUM 40 MG: 40 TABLET, FILM COATED ORAL at 20:56

## 2022-02-01 NOTE — CONSULTS
[facility]   Consult Note    Patient Name: Dorie Head  : 1950  MRN: [id number]  Primary Care Physician: Capri Pittman APRN  Referring Physician: No ref. provider found  Date of admission: 2022    Subjective   Subjective     Reason for Consult: Left arm pain, angina equivalent    HPI:  Dorie Head is a 71 y.o. female patient admitted to the hospital because of left arm pain associated with diaphoresis, suggestive of angina, patient recently was diagnosed to have covid currently she is feeling better, she has history of hyperlipidemia high blood pressure coronary artery disease COPD, she still smokes., Her EKG showed no evidence of acute changes troponins are normal, I have discussed with the patient that we will get an echocardiogram and a nuclear study, she agreed with it. Potassium is 3.3    Review of Systems  Review of Systems   Constitutional: Negative.    HENT: Positive for congestion and postnasal drip. Negative for ear pain.         Patient has hearing deficit   Eyes: Negative.    Respiratory: Positive for cough. Negative for chest tightness.    Cardiovascular: Negative for chest pain.   Gastrointestinal: Negative.    Endocrine: Negative.    Genitourinary: Negative.    Musculoskeletal: Negative.    Skin: Negative.    Neurological: Negative.    Hematological: Negative.    Psychiatric/Behavioral: Negative.        Personal History     Past Medical History:   Diagnosis Date   • Basal cell carcinoma    • Cancer (HCC)    • Cancer (HCC)     squamos cell carcinoma   • COPD (chronic obstructive pulmonary disease) (HCC)    • Coronary artery disease    • High blood pressure    • High cholesterol        Past Surgical History:   Procedure Laterality Date   • COLONOSCOPY  2017   • HYSTERECTOMY     • MOLE REMOVAL     • OTHER SURGICAL HISTORY      fallopian tube dilation   • TUBAL ABDOMINAL LIGATION         Family History: family history includes Diabetes in her mother; Ovarian cancer in her sister.  Otherwise pertinent FHx was reviewed and not pertinent to current issue.    Social History:  reports that she has been smoking. She has been smoking about 0.50 packs per day. She has never used smokeless tobacco. She reports that she does not drink alcohol and does not use drugs.    Home Medications:  Budesonide, Cholecalciferol, amLODIPine, aspirin, carvedilol, ezetimibe, hydroCHLOROthiazide, lisinopril, and pravastatin    Hospital Medications:    Current Facility-Administered Medications:   •  acetaminophen (TYLENOL) tablet 650 mg, 650 mg, Oral, Q4H PRN, Gabe Faria MD  •  [COMPLETED] aspirin chewable tablet 324 mg, 324 mg, Oral, Once, 324 mg at 02/01/22 1024 **AND** [START ON 2/2/2022] aspirin EC tablet 81 mg, 81 mg, Oral, Daily, Gabe Faria MD  •  atorvastatin (LIPITOR) tablet 40 mg, 40 mg, Oral, Nightly, Gabe Faria MD  •  enoxaparin (LOVENOX) syringe 40 mg, 40 mg, Subcutaneous, Q24H, Gabe Faria MD, 40 mg at 02/01/22 1005  •  nicotine polacrilex (COMMIT) lozenge 2 mg, 2 mg, Mouth/Throat, Q1H PRN, Gabe Faria MD  •  nitroglycerin (NITROSTAT) SL tablet 0.4 mg, 0.4 mg, Sublingual, Q5 Min PRN, Gabe Faria MD, 0.4 mg at 02/01/22 1752  •  ondansetron (ZOFRAN) injection 4 mg, 4 mg, Intravenous, Q6H PRN, Gabe Faria MD, 4 mg at 02/01/22 1545  •  sodium chloride 0.9 % flush 10 mL, 10 mL, Intravenous, PRN, Tim Hester, DO  •  sodium chloride 0.9 % flush 10 mL, 10 mL, Intravenous, Q12H, Gabe Faria MD, 10 mL at 02/01/22 1132  •  sodium chloride 0.9 % flush 10 mL, 10 mL, Intravenous, PRN, Gbae Faria MD    Allergies:  No Known Allergies    Objective    Objective   Vitals:  Temp:  [97.7 °F (36.5 °C)-97.9 °F (36.6 °C)] 97.9 °F (36.6 °C)  Heart Rate:  [59-92] 62  Resp:  [18-20] 18  BP: (100-144)/(49-65) 143/59    Physical Exam:  Physical Exam  Constitutional:       Appearance: Normal appearance.   HENT:      Head: Normocephalic and atraumatic.      Nose: Congestion present.   Eyes:       Extraocular Movements: Extraocular movements intact.   Cardiovascular:      Rate and Rhythm: Normal rate and regular rhythm.      Heart sounds: No murmur heard.      Pulmonary:      Breath sounds: Rhonchi present.   Abdominal:      General: Bowel sounds are normal.   Musculoskeletal:         General: Normal range of motion.   Skin:     General: Skin is warm.   Neurological:      General: No focal deficit present.      Mental Status: She is alert and oriented to person, place, and time.   Psychiatric:         Mood and Affect: Mood normal.           Result Review    Result Review:  I have personally reviewed the results from the time of this admission to 02/01/22 6:44 PM EST and agree with these findings:  [x]  Laboratory  [x]  Microbiology  [x]  Radiology  [x]  EKG/Telemetry   [x]  Cardiology/Vascular   []  Pathology  []  Old records  []  Other:    Most notable findings include: Patient is a 71-year-old female with multiple coronary disease risk factor patient has coronary disease, has recurrent left arm pain suggestive of angina equivalent, she still smokes, cardiac enzymes are normal so far, I am planning to do a nuclear study in the morning also an echocardiogram, will follow patient closely with you thanks.    Assessment/Plan   Assessment / Plan     Active Hospital Problems:  Active Hospital Problems    Diagnosis    • Chest pain at rest        Plan:   As per orders    Electronically signed by Lucius Barba MD, 02/01/22, 6:44 PM EST.

## 2022-02-01 NOTE — H&P
[facility]  Date: 2022   Patient Name: Dorie Head  : 1950  MRN: [id number]  Primary Care Physician:  Capri Pittman APRN  Date of admission: 2022    Subjective   Subjective     Chief Complaint: Chest pain    HPI:    Dorie Head is a 71 y.o. female past medical history significant for COPD, hypertension, hyperlipidemia, skin cancer, and recent Covid diagnosis 7 days prior to presentation presented with numbness and tingling left upper extremity extending from the shoulder down to the left hand with associated chest heaviness occasional right shoulder pain, associated nausea diaphoresis and felt like she was going to pass out.  Initially started the day prior to presentation but resolved spontaneously returned on the morning of presentation and not improved so she presented the emergency department for further evaluation treatment.  In the emergency department patient is afebrile.  Sinus rhythm 60s to 90s on telemetry review.  Serum blood sugar slightly elevated to 127.  Serum potassium low at 3.4.  Creatinine elevated slightly to 1.12.  A1c within normal limits.  Lipid profile within normal limits.  CRP only modestly elevated.  Serum magnesium slightly low.  D-dimer within normal limits.  White blood cell count within normal limits.  Urinalysis negative for nitrates leukocytes with 1+ bacteria.  Covid detected by PCR.  Chest x-ray negative for acute infiltrate.  CT the C-spine demonstrated moderate to severe degenerative changes but no acute cervical spine fractures.  CT the head within normal limits.  Repeat troponins within normal limits.  EKG sinus rhythm without ischemic changes.      Personal History     Past Medical History:  Past Medical History:   Diagnosis Date   • Basal cell carcinoma    • Cancer (HCC)    • Cancer (HCC)     squamos cell carcinoma   • COPD (chronic obstructive pulmonary disease) (HCC)    • Coronary artery disease    • High blood  pressure    • High cholesterol         Past Surgical History:  Past Surgical History:   Procedure Laterality Date   • COLONOSCOPY  2017   • HYSTERECTOMY     • MOLE REMOVAL     • OTHER SURGICAL HISTORY      fallopian tube dilation   • TUBAL ABDOMINAL LIGATION          Family History:   Family History   Problem Relation Age of Onset   • Diabetes Mother    • Ovarian cancer Sister         Social History:   Social History     Socioeconomic History   • Marital status:    Tobacco Use   • Smoking status: Current Every Day Smoker     Packs/day: 0.50   • Smokeless tobacco: Never Used   • Tobacco comment: started smoking at age 4; smoked 10 ciagarette (s) per day   Substance and Sexual Activity   • Alcohol use: Never   • Drug use: Never   • Sexual activity: Defer        Home Medications:  Budesonide, Cholecalciferol, amLODIPine, aspirin, carvedilol, ezetimibe, hydroCHLOROthiazide, lisinopril, and pravastatin    Allergies:  No Known Allergies    Review of Systems   Constitutional: Negative for fatigue and fever.   HENT: Negative for sore throat and trouble swallowing.    Eyes: Negative for pain and discharge.   Respiratory: Negative for cough and shortness of breath.    Cardiovascular: Positive for chest pain. Negative for palpitations.   Gastrointestinal: Positive for nausea. Negative for abdominal pain and vomiting.   Endocrine: Negative for cold intolerance and heat intolerance.   Genitourinary: Negative for difficulty urinating and dysuria.   Musculoskeletal: Negative for back pain and neck stiffness.   Skin: Negative for color change and rash.   Neurological: Positive for light-headedness and numbness. Negative for syncope and headaches.   Hematological: Negative for adenopathy.   Psychiatric/Behavioral: Negative for confusion and hallucinations.        Objective   Objective     Vitals:   Temp:  [97.4 °F (36.3 °C)-97.9 °F (36.6 °C)] 97.4 °F (36.3 °C)  Heart Rate:  [59-92] 61  Resp:  [18-20] 18  BP:  (100-144)/(49-65) 111/59    Physical Exam   Gen. well-developed appearing stated age in no acute distress  HEENT: Normocephalic atraumatic moist membranes pupils equal round reactive light, no scleral icterus no conjunctival injection  Cardiovascular: regular rate and rhythm no murmurs rubs or gallops S1-S2, no lower extremity edema appreciated  Pulmonary: Clear to auscultation bilaterally no wheezes rales or rhonchi symmetric chest expansion, unlabored, no conversational dyspnea appreciated  Gastrointestinal: Soft nontender nondistended positive bowel sounds all 4 quadrants no rebound or guarding  Musculoskeletal: No clubbing cyanosis, warm and well-perfused, calves soft symmetric nontender bilaterally  Skin: Clean dry without rashs  Neuro: Cranial nerves II through XII intact grossly no sensorimotor deficits appreciated bilateral upper and lower extremities  Psych: Patient is calm cooperative and appropriate with exam not responding to internal stimuli  : No Pearson catheter no bladder distention no suprapubic tenderness    Result Review    Result Review:  I have personally reviewed the results from the time of this admission to 2/1/2022 22:12 EST and agree with these findings:  [x]  Laboratory  [x]  Microbiology  [x]  Radiology  [x]  EKG/Telemetry   []  Cardiology/Vascular   []  Pathology  []  Old records  []  Other:      Assessment/Plan   Assessment / Plan     Assessment/Plan:   Chest pain rule out ACS  SARS-CoV-2 infection  Hypokalemia  Hypomagnesemia  Left upper extremity pain numbness and tingling  Nausea without vomiting  COPD without acute exacerbation  History of hypertension  History of hyperlipidemia      Patient admitted for further evaluation and treatment to monitored bed  Cardiology consulted thank you for assistance  Continue repeat serial troponins  Start nitro as needed for chest pain  Continue home aspirin  Plan for stress test in a.m.  Get echocardiogram   Replace potassium p.o. and recheck in  a.m.  Replace magnesium recheck in a.m.  Cardiac work-up negative and pain continues could consider further imaging of the C-spine  Continue monitor respiratory function closely  Outside window for remdesivir  Patient not hypoxic no indication for Decadron at this time  Restart home antihypertensives as needed  Continue home atorvastatin    Discussed case with patient's nurse at bedside.  Discussed case with emergency room attending.  Further inpatient was recommendations pending clinical course.    DVT prophylaxis:  Medical DVT prophylaxis orders are present.    CODE STATUS:    Code Status (Patient has no pulse and is not breathing): CPR (Attempt to Resuscitate)  Medical Interventions (Patient has pulse or is breathing): Full Support      Admission Status:  I believe this patient meets observation status.

## 2022-02-01 NOTE — ED PROVIDER NOTES
"Time: 2:23 AM EST  Arrived by: Private car  Chief Complaint: Extremity numbness  History provided by: Patient  History is limited by: N/A     History of Present Illness:    Dorie Head is a 71 y.o. female who presents to the emergency department today with complaints of extremity numbness. The patient reports that she developed rhinorrhea, cough, and a measured temperature of 99.8. She states that on 1/26/2022, she tested positive for COVID-19 with a home test. She decided to call her PCP at that time and was advised to take anti-pyretics and stay hydrated. It was also recommended that she come to the ED with worsened symptoms.    Two days ago, she states that she developed a sensation of \"ice and fire,\" \"heaviness,\" and numbness to the left upper extremity. It is present primarily in the upper arm. She has also had associated nausea, dry heaving, generalized weakness, and a \"cold sweat.\" She notes that the sensation in her arm went away for a time, but that this morning, it returned and has not resolved.  The patient states that at 1 point when she developed the left arm numbness and heaviness she broke out into a sweat.  She also notes a near syncopal episode with this event.  She denies a history of this symptom previously. She denies any numbness to the legs or neck pain.    The patient denies any shortness of breath or chest pain, but has had a cough. She has also had diarrhea; however, she notes that she does have active colitis which she is taking medication for. She denies any abdominal pain.    The patient has a medical history of basal cell carcinoma, COPD, hypertension, and high cholesterol. She is a current smoker but denies alcohol or drug use.          Similar Symptoms Previously: No.  Recently seen: Patient was seen by Shanna Jones on 10/18/2021 with collagenous colitis.      Patient Care Team  Primary Care Provider: Capri Pittman APRN  [facility] (Patient states she sees either " "\"Capri or Ledy\").  Past Medical History:     No Known Allergies  Past Medical History:   Diagnosis Date   • Basal cell carcinoma    • Cancer (HCC)    • Cancer (HCC)     squamos cell carcinoma   • COPD (chronic obstructive pulmonary disease) (HCC)    • High blood pressure    • High cholesterol      Past Surgical History:   Procedure Laterality Date   • COLONOSCOPY  2017   • HYSTERECTOMY     • MOLE REMOVAL     • OTHER SURGICAL HISTORY      fallopian tube dilation   • TUBAL ABDOMINAL LIGATION       Family History   Problem Relation Age of Onset   • Diabetes Mother    • Ovarian cancer Sister        Home Medications:  Prior to Admission medications    Medication Sig Start Date End Date Taking? Authorizing Provider   amLODIPine (NORVASC) 10 MG tablet  9/13/21   Zulema Jones MD   aspirin (aspirin) 81 MG EC tablet aspirin 81 mg oral tablet,delayed release (DR/EC) take 1 tablet (81 mg) by oral route once daily   Active    Zulema Jones MD   carvedilol (COREG) 12.5 MG tablet  9/13/21   Zulema Jones MD   Cholecalciferol 50 MCG (2000 UT) capsule Vitamin D3 2,000 unit oral capsule take 1 capsule by oral route daily   Suspended    Zulema Jones MD   ezetimibe (ZETIA) 10 MG tablet  9/13/21   Zulema Jones MD   hydroCHLOROthiazide (HYDRODIURIL) 25 MG tablet hydrochlorothiazide 25 mg oral tablet take 1 tablet (25 mg) by oral route once daily   Active    Zulema Jones MD   lisinopril (PRINIVIL,ZESTRIL) 40 MG tablet lisinopril 40 mg oral tablet take 1 tablet (40 mg) by oral route once daily   Active    Zulema Jones MD   pravastatin (PRAVACHOL) 40 MG tablet pravastatin 40 mg oral tablet take 1 tablet (40 mg) by oral route once daily   Active    ProviderZulema MD        Social History:   Social History     Tobacco Use   • Smoking status: Current Every Day Smoker     Packs/day: 0.50   • Smokeless tobacco: Never Used   • Tobacco comment: started smoking at age 4; smoked " "10 ciagarette (s) per day   Substance Use Topics   • Alcohol use: Never   • Drug use: Never         Review of Systems:  Review of Systems   Constitutional: Positive for diaphoresis (cold sweat) and fatigue. Negative for chills and fever (temperature of 99.8 but no documented fever).   HENT: Positive for rhinorrhea. Negative for nosebleeds.    Eyes: Negative for redness.   Respiratory: Positive for cough. Negative for shortness of breath.    Cardiovascular: Negative for chest pain.   Gastrointestinal: Positive for diarrhea (patient states she has active colitis) and nausea. Negative for abdominal pain and vomiting.   Genitourinary: Negative for dysuria and frequency.   Musculoskeletal: Negative for back pain and neck pain.   Skin: Negative for rash.   Neurological: Positive for weakness (generalized) and numbness (left arm). Negative for syncope.        No numbness to the lower extremities.        Physical Exam:  /52   Pulse 62   Temp 97.7 °F (36.5 °C) (Oral)   Resp 20   Ht 175.3 cm (69\")   Wt 50 kg (110 lb 3.7 oz)   SpO2 92%   BMI 16.28 kg/m²     Physical Exam  Vitals and nursing note reviewed.   Constitutional:       General: She is not in acute distress.     Appearance: Normal appearance.   HENT:      Head: Normocephalic and atraumatic.      Nose: Nose normal.      Mouth/Throat:      Pharynx: Oropharynx is clear.   Eyes:      General: No scleral icterus.     Conjunctiva/sclera: Conjunctivae normal.   Neck:      Comments: No pain that does down the patient's arm with rotation, side bending, flexion, and extension of the neck.  Cardiovascular:      Rate and Rhythm: Normal rate and regular rhythm.      Heart sounds: Normal heart sounds. No murmur heard.       Comments: Pulses are diminished at +1.  Pulmonary:      Effort: No respiratory distress.      Breath sounds: No wheezing, rhonchi or rales.      Comments: Lungs are diminished.  Chest:      Chest wall: No tenderness.   Abdominal:      Palpations: " Abdomen is soft.      Tenderness: There is no abdominal tenderness. There is no guarding or rebound.      Comments: No rigidity.   Musculoskeletal:         General: No tenderness. Normal range of motion.      Cervical back: Normal range of motion and neck supple. No rigidity. No pain with movement. Normal range of motion.      Right lower leg: No tenderness. No edema.      Left lower leg: No tenderness. No edema.      Comments: Patient had good range of motion at the left shoulder with good internal rotation, external rotation, extension, flexion, and abduction. This did not reproduce her symptoms.   Skin:     General: Skin is warm and dry.   Neurological:      Mental Status: She is alert. Mental status is at baseline.      Cranial Nerves: Cranial nerves are intact. No cranial nerve deficit.      Sensory: Sensory deficit present.      Motor: Motor function is intact. No weakness or pronator drift.      Comments: Cranial nerves two through twelve are intact. No weakness or pronator drift to the upper and lower extremities bilaterally. Good sensation in the face bilaterally. Patient has good sensation in the lower extremities bilaterally which is equal. She does have some slightly decreased pinprick sensation in the left lateral deltoid, but otherwise has good sensation in the upper extremities bilaterally.   Psychiatric:         Mood and Affect: Mood normal.         Behavior: Behavior normal.                Medications in the Emergency Department:  Medications   sodium chloride 0.9 % flush 10 mL (has no administration in time range)        Labs  Lab Results (last 24 hours)     Procedure Component Value Units Date/Time    COVID-19,APTIMA PANTHER(FLAQUITA),[facility]/ [facility], NP/OP SWAB IN UTM/VTM/[facility] TRANSPORT MEDIA,24 HR TAT - Swab, Nasopharynx [[id number]]  (Abnormal) Collected: 01/31/22 2320    Specimen: Swab from Nasopharynx Updated: 02/01/22 0554     COVID19 Detected    Narrative:      Fact sheet for providers:  https://www.fda.gov/media/774980/download     Fact sheet for patients: https://www.fda.gov/media/419723/download    Test performed by RT PCR.    CBC & Differential [[id number]]  (Abnormal) Collected: 01/31/22 2325    Specimen: Blood Updated: 01/31/22 2330    Narrative:      The following orders were created for panel order CBC & Differential.  Procedure                               Abnormality         Status                     ---------                               -----------         ------                     CBC Auto Differential[[id number]]        Abnormal            Final result                 Please view results for these tests on the individual orders.    Comprehensive Metabolic Panel [[id number]]  (Abnormal) Collected: 01/31/22 2325    Specimen: Blood Updated: 01/31/22 2351     Glucose 127 mg/dL      BUN 20 mg/dL      Creatinine 1.12 mg/dL      Sodium 135 mmol/L      Potassium 3.4 mmol/L      Chloride 95 mmol/L      CO2 28.4 mmol/L      Calcium 9.8 mg/dL      Total Protein 7.4 g/dL      Albumin 4.30 g/dL      ALT (SGPT) 7 U/L      AST (SGOT) 12 U/L      Alkaline Phosphatase 68 U/L      Total Bilirubin 0.4 mg/dL      eGFR Non African Amer 48 mL/min/1.73      Globulin 3.1 gm/dL      A/G Ratio 1.4 g/dL      BUN/Creatinine Ratio 17.9     Anion Gap 11.6 mmol/L     Narrative:      GFR Normal >60  Chronic Kidney Disease <60  Kidney Failure <15      Troponin [[id number]]  (Normal) Collected: 01/31/22 2325    Specimen: Blood Updated: 01/31/22 2354     Troponin T <0.010 ng/mL     Narrative:      Troponin T Reference Range:  <= 0.03 ng/mL-   Negative for AMI  >0.03 ng/mL-     Abnormal for myocardial necrosis.  Clinicians would have to utilize clinical acumen, EKG, Troponin and serial changes to determine if it is an Acute Myocardial Infarction or myocardial injury due to an underlying chronic condition.       Results may be falsely decreased if patient taking Biotin.      Magnesium [[id number]]  (Normal) Collected:  01/31/22 2325    Specimen: Blood Updated: 01/31/22 2351     Magnesium 1.8 mg/dL     CBC Auto Differential [[id number]]  (Abnormal) Collected: 01/31/22 2325    Specimen: Blood Updated: 01/31/22 2330     WBC 7.63 10*3/mm3      RBC 3.99 10*6/mm3      Hemoglobin 13.7 g/dL      Hematocrit 37.9 %      MCV 95.0 fL      MCH 34.3 pg      MCHC 36.1 g/dL      RDW 11.9 %      RDW-SD 41.5 fl      MPV 9.6 fL      Platelets 276 10*3/mm3      Neutrophil % 69.2 %      Lymphocyte % 20.2 %      Monocyte % 6.8 %      Eosinophil % 3.0 %      Basophil % 0.7 %      Immature Grans % 0.1 %      Neutrophils, Absolute 5.28 10*3/mm3      Lymphocytes, Absolute 1.54 10*3/mm3      Monocytes, Absolute 0.52 10*3/mm3      Eosinophils, Absolute 0.23 10*3/mm3      Basophils, Absolute 0.05 10*3/mm3      Immature Grans, Absolute 0.01 10*3/mm3      nRBC 0.0 /100 WBC     Urinalysis With Microscopic If Indicated (No Culture) - Urine, Clean Catch [[id number]]  (Abnormal) Collected: 02/01/22 0510    Specimen: Urine, Clean Catch Updated: 02/01/22 0521     Color, UA Yellow     Appearance, UA Clear     pH, UA <=5.0     Specific Gravity, UA 1.014     Glucose, UA Negative     Ketones, UA Negative     Bilirubin, UA Negative     Blood, UA Trace     Protein, UA Trace     Leuk Esterase, UA Negative     Nitrite, UA Negative     Urobilinogen, UA 0.2 E.U./dL    Urinalysis, Microscopic Only - Urine, Clean Catch [[id number]]  (Abnormal) Collected: 02/01/22 0510    Specimen: Urine, Clean Catch Updated: 02/01/22 0521     RBC, UA 0-2 /HPF      WBC, UA 3-5 /HPF      Bacteria, UA 1+ /HPF      Squamous Epithelial Cells, UA 3-6 /HPF      Hyaline Casts, UA 3-6 /LPF      Methodology Automated Microscopy           Imaging:  CT Head Without Contrast    Result Date: 2/1/2022  PROCEDURE: CT HEAD WO CONTRAST  COMPARISON: None  INDICATIONS: LEFT SHOULDER & ARM NUMBNESS X 1 DAY.  PROTOCOL:   Standard imaging protocol performed    RADIATION:   DLP: 1,228.6 mGy*cm   MA and/or KV were/was  adjusted to minimize radiation dose.    TECHNIQUE: After obtaining the patient's consent, 123 CT images were obtained without non-ionic intravenous contrast material.  DISCUSSION:  A routine nonenhanced head CT was performed. No acute brain abnormality is identified. No acute intracranial hemorrhage. No acute infarction. No acute skull fracture. No midline shift or acute intracranial mass effect is seen.  Minimal chronic small vessel ischemia/infarction is suspected. There are arterial calcifications. The extra-axial spaces and the ventricular system are mildly prominent.  Age-indeterminate, but probably chronic, congestive and/or inflammatory changes involve the imaged paranasal sinuses, especially the bilateral ethmoid and sphenoid paranasal sinuses.  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  Benign external auditory canal debris is suspected, especially on the right.  There are degenerative changes of partially imaged cervical spine.  No acute orbital findings are appreciated.        No acute brain abnormality is seen.     MIKY RICE JR, MD       Electronically Signed and Approved By: MIKY RICE JR, MD on 2/01/2022 at 5:27             CT Cervical Spine Without Contrast    Result Date: 2/1/2022  PROCEDURE: CT CERVICAL SPINE WO CONTRAST  COMPARISON: None.  INDICATIONS: NECK PAIN.  NUMBNESS IN LEFT ARM AND SHOULDER.  PROTOCOL:   Standard trauma CT imaging protocol performed.    RADIATION:   DLP: 1,228.6 mGy*cm   MA and/or KV were/was adjusted to minimize radiation dose.    TECHNIQUE: After obtaining the patient's consent, multi-planar CT images were created without intravenous or intrathecal contrast material.  The axial scan angle limits assessment on the study.  EXAM FINDINGS: A routine nonenhanced cervical spine CT was performed. Sagittal and coronal two-dimensional reformations are provided for review. No acute cervical spine fracture or acute malalignment is identified. Small nonspecific bilateral  cervical lymph nodes are seen.  Moderate to severe emphysematous changes involve the partially imaged lung apices.  Pleural-parenchymal scarring is present, as well.  There are arterial calcifications.  Streak artifact from dental amalgam obscures detail.  Moderate-to-severe degenerative changes of the cervical spine are seen at multiple levels with disc and endplate degenerative changes as well as uncovertebral and facet osteoarthritis.  Probably the greatest degree of degenerative change is seen at C5-6 and C6-7 with moderate to severe bilateral (C6 and C7) neural foraminal narrowing.  There is mild spinal canal narrowing suspected at these levels.  There is mild to moderate left-sided C4 foraminal narrowing.  There is mild to moderate bilateral C5 foraminal narrowing.  No significant anteroposterior alignment abnormality is seen.  Degenerative changes involve the atlantoaxial joint.        No acute cervical spine fracture is seen.  Moderate to severe degenerative changes are present as discussed.     MIKY RICE JR, MD       Electronically Signed and Approved By: MIKY RICE JR, MD on 2/01/2022 at 5:33             XR Chest 1 View    Result Date: 2/1/2022  PROCEDURE: XR CHEST 1 VW  COMPARISON: [facility], , CHEST AP/PA 1 VIEW, 5/07/2019, 10:07.  INDICATIONS: FATIGUE; NAUSEA.  FINDINGS:  A single AP upright portable chest radiograph was performed.  No cardiac enlargement is seen.  No acute infiltrate is appreciated.  No pleural effusion or pneumothorax is identified.  The thoracic aorta is atherosclerotic.  Chronic calcified granulomatous disease involves the chest.  No significant interval change is seen since the prior study.        No acute infiltrate is appreciated.      MIKY RICE JR, MD       Electronically Signed and Approved By: MIKY RICE JR, MD on 2/01/2022 at 2:04               Procedures:  Procedures    Progress  ED Course as of 02/01/22 0756   Mon Jan 31, 2022   4370  EKG:    Rhythm: Sinus rhythm  Rate: 62  Intervals: Borderline NE interval and normal QT interval  T-wave: Nonspecific T wave flattening  ST Segment: No obvious pathological ST elevation or ST depression    EKG Comparison: The QRS and ST morphology is unchanged from EKG performed February 5, 2020    Interpreted by me   [SD]      ED Course User Index  [SD] Tim Hester DO                            Medical Decision Making:  MDM  Number of Diagnoses or Management Options  Diagnosis management comments:     HEART Score for Major Cardiac Events - MDCalc  Calculated on Feb 01 2022 7:45 AM  5 points -> Moderate Score (4-6 points) Risk of MACE of 12-16.6%.       Amount and/or Complexity of Data Reviewed  Clinical lab tests: reviewed  Tests in the radiology section of CPT®: reviewed  Tests in the medicine section of CPT®: reviewed  Discuss the patient with other providers: yes (I discussed the patient with the patient's cardiologist, Dr. Barba.  He expresses concern due to the patient's nonreproducible left arm heaviness, numbness, sweating and nausea that this may be a cardiac equivalent.  Patient does have a high heart score.  He request the patient be admitted to the hospital for observation, for serial cardiac enzymes and he will see the patient in consultation for possible stress test)                 Final diagnoses:   COVID-19   Unstable angina (HCC)        Disposition:  ED Disposition     ED Disposition Condition Comment    Decision to Admit            Part of this note may be an electronic transcription/translation of spoken language to printed text using the Dragon Dictation System.     Documentation assistance provided by Eryn Coles acting as scribe for Tim Hester DO. Information recorded by the scribe was done at my direction and has been verified and validated by me.        Eryn Coles  02/01/22 0318       Eryn Coles  02/01/22 0327       Eryn Coles  02/01/22 0328       Eryn Coles  02/01/22  0328       Tim Hester DO  02/06/22 0204

## 2022-02-02 ENCOUNTER — APPOINTMENT (OUTPATIENT)
Dept: NUCLEAR MEDICINE | Facility: HOSPITAL | Age: 72
End: 2022-02-02

## 2022-02-02 ENCOUNTER — READMISSION MANAGEMENT (OUTPATIENT)
Dept: CALL CENTER | Facility: HOSPITAL | Age: 72
End: 2022-02-02

## 2022-02-02 VITALS
DIASTOLIC BLOOD PRESSURE: 46 MMHG | WEIGHT: 132.06 LBS | BODY MASS INDEX: 19.56 KG/M2 | OXYGEN SATURATION: 93 % | HEIGHT: 69 IN | HEART RATE: 68 BPM | RESPIRATION RATE: 18 BRPM | SYSTOLIC BLOOD PRESSURE: 122 MMHG | TEMPERATURE: 97.7 F

## 2022-02-02 PROBLEM — U07.1 COVID-19: Status: ACTIVE | Noted: 2022-02-02

## 2022-02-02 PROBLEM — D89.831 CYTOKINE RELEASE SYNDROME, GRADE 1: Status: ACTIVE | Noted: 2022-02-02

## 2022-02-02 LAB
ANION GAP SERPL CALCULATED.3IONS-SCNC: 10.9 MMOL/L (ref 5–15)
BH CV ECHO MEAS - EDV(MOD-SP4): 70 ML
BH CV ECHO MEAS - EF(MOD-BP): 65 %
BH CV ECHO MEAS - ESV(MOD-SP4): 24.4 ML
BH CV IMMEDIATE POST RECOVERY TECH DATA SYMPTOMS: NORMAL
BH CV IMMEDIATE POST TECH DATA BLOOD PRESSURE: NORMAL MMHG
BH CV IMMEDIATE POST TECH DATA HEART RATE: 102 BPM
BH CV IMMEDIATE POST TECH DATA OXYGEN SATS: 94 %
BH CV REST NUCLEAR ISOTOPE DOSE: 9.8 MCI
BH CV SIX MINUTE RECOVERY TECH DATA BLOOD PRESSURE: NORMAL
BH CV SIX MINUTE RECOVERY TECH DATA HEART RATE: 83 BPM
BH CV SIX MINUTE RECOVERY TECH DATA OXYGEN SATURATION: 90 %
BH CV STRESS BP STAGE 1: NORMAL
BH CV STRESS COMMENTS STAGE 1: NORMAL
BH CV STRESS DOSE REGADENOSON STAGE 1: 0.4
BH CV STRESS DURATION MIN STAGE 1: 0
BH CV STRESS DURATION SEC STAGE 1: 10
BH CV STRESS HR STAGE 1: 68
BH CV STRESS NUCLEAR ISOTOPE DOSE: 37.1 MCI
BH CV STRESS O2 STAGE 1: 92
BH CV STRESS PROTOCOL 1: NORMAL
BH CV STRESS RECOVERY BP: NORMAL MMHG
BH CV STRESS RECOVERY HR: 83 BPM
BH CV STRESS RECOVERY O2: 90 %
BH CV STRESS STAGE 1: 1
BH CV THREE MINUTE POST TECH DATA BLOOD PRESSURE: NORMAL MMHG
BH CV THREE MINUTE POST TECH DATA HEART RATE: 99 BPM
BH CV THREE MINUTE POST TECH DATA OXYGEN SATURATION: 93 %
BUN SERPL-MCNC: 18 MG/DL (ref 8–23)
BUN/CREAT SERPL: 20.5 (ref 7–25)
CALCIUM SPEC-SCNC: 9.5 MG/DL (ref 8.6–10.5)
CHLORIDE SERPL-SCNC: 94 MMOL/L (ref 98–107)
CO2 SERPL-SCNC: 25.1 MMOL/L (ref 22–29)
CREAT SERPL-MCNC: 0.88 MG/DL (ref 0.57–1)
GFR SERPL CREATININE-BSD FRML MDRD: 64 ML/MIN/1.73
GLUCOSE SERPL-MCNC: 88 MG/DL (ref 65–99)
LV EF NUC BP: 65 %
MAGNESIUM SERPL-MCNC: 2 MG/DL (ref 1.6–2.4)
MAXIMAL PREDICTED HEART RATE: 151 BPM
MAXIMAL PREDICTED HEART RATE: 151 BPM
PERCENT MAX PREDICTED HR: 45.03 %
POTASSIUM SERPL-SCNC: 4.3 MMOL/L (ref 3.5–5.2)
SODIUM SERPL-SCNC: 130 MMOL/L (ref 136–145)
STRESS BASELINE BP: NORMAL MMHG
STRESS BASELINE HR: 78 BPM
STRESS O2 SAT REST: 92 %
STRESS PERCENT HR: 53 %
STRESS POST O2 SAT PEAK: 92 %
STRESS POST PEAK BP: NORMAL MMHG
STRESS POST PEAK HR: 68 BPM
STRESS TARGET HR: 128 BPM
STRESS TARGET HR: 128 BPM
TROPONIN T SERPL-MCNC: <0.01 NG/ML (ref 0–0.03)
TROPONIN T SERPL-MCNC: <0.01 NG/ML (ref 0–0.03)

## 2022-02-02 PROCEDURE — A9502 TC99M TETROFOSMIN: HCPCS | Performed by: FAMILY MEDICINE

## 2022-02-02 PROCEDURE — 0 TECHNETIUM TETROFOSMIN KIT: Performed by: FAMILY MEDICINE

## 2022-02-02 PROCEDURE — 96372 THER/PROPH/DIAG INJ SC/IM: CPT

## 2022-02-02 PROCEDURE — G0378 HOSPITAL OBSERVATION PER HR: HCPCS

## 2022-02-02 PROCEDURE — 25010000002 REGADENOSON 0.4 MG/5ML SOLUTION

## 2022-02-02 PROCEDURE — 99217 PR OBSERVATION CARE DISCHARGE MANAGEMENT: CPT | Performed by: FAMILY MEDICINE

## 2022-02-02 PROCEDURE — 78452 HT MUSCLE IMAGE SPECT MULT: CPT

## 2022-02-02 PROCEDURE — 25010000002 ENOXAPARIN PER 10 MG: Performed by: FAMILY MEDICINE

## 2022-02-02 PROCEDURE — 84484 ASSAY OF TROPONIN QUANT: CPT | Performed by: FAMILY MEDICINE

## 2022-02-02 PROCEDURE — 83735 ASSAY OF MAGNESIUM: CPT | Performed by: FAMILY MEDICINE

## 2022-02-02 PROCEDURE — 93017 CV STRESS TEST TRACING ONLY: CPT

## 2022-02-02 PROCEDURE — 80048 BASIC METABOLIC PNL TOTAL CA: CPT | Performed by: FAMILY MEDICINE

## 2022-02-02 RX ADMIN — REGADENOSON: 0.08 INJECTION, SOLUTION INTRAVENOUS at 09:14

## 2022-02-02 RX ADMIN — ENOXAPARIN SODIUM 40 MG: 40 INJECTION SUBCUTANEOUS at 10:46

## 2022-02-02 RX ADMIN — ASPIRIN 81 MG: 81 TABLET, COATED ORAL at 10:46

## 2022-02-02 RX ADMIN — TETROFOSMIN 1 DOSE: 1.38 INJECTION, POWDER, LYOPHILIZED, FOR SOLUTION INTRAVENOUS at 09:14

## 2022-02-02 RX ADMIN — SODIUM CHLORIDE, PRESERVATIVE FREE 10 ML: 5 INJECTION INTRAVENOUS at 10:39

## 2022-02-02 RX ADMIN — TETROFOSMIN 1 DOSE: 1.38 INJECTION, POWDER, LYOPHILIZED, FOR SOLUTION INTRAVENOUS at 07:58

## 2022-02-02 NOTE — PLAN OF CARE
Problem: Adult Inpatient Plan of Care  Goal: Plan of Care Review  Outcome: Ongoing, Progressing  Flowsheets (Taken 2/2/2022 0445)  Progress: no change  Plan of Care Reviewed With: patient  Outcome Summary: no acute distress noted this shift, pt is on RA and up ad lazaro. No C/O pain or chest discomfort. Will continue to monitor  Goal: Patient-Specific Goal (Individualized)  Outcome: Ongoing, Progressing  Goal: Absence of Hospital-Acquired Illness or Injury  Outcome: Ongoing, Progressing  Intervention: Identify and Manage Fall Risk  Recent Flowsheet Documentation  Taken 2/2/2022 0600 by Thorn, Erinne, RN  Safety Promotion/Fall Prevention: safety round/check completed  Taken 2/2/2022 0400 by Thorn, Erinne, RN  Safety Promotion/Fall Prevention: safety round/check completed  Taken 2/2/2022 0200 by Thorn, Erinne, RN  Safety Promotion/Fall Prevention: safety round/check completed  Taken 2/2/2022 0000 by Thorn, Erinne, RN  Safety Promotion/Fall Prevention: safety round/check completed  Taken 2/1/2022 2200 by Thorn, Erinne, RN  Safety Promotion/Fall Prevention: safety round/check completed  Taken 2/1/2022 2000 by Thorn, Erinne, RN  Safety Promotion/Fall Prevention: safety round/check completed  Intervention: Prevent Skin Injury  Recent Flowsheet Documentation  Taken 2/1/2022 2045 by Thorn, Erinne, RN  Body Position: position changed independently  Skin Protection:   electrode sites changed   adhesive use limited  Intervention: Prevent and Manage VTE (venous thromboembolism) Risk  Recent Flowsheet Documentation  Taken 2/1/2022 2045 by Thorn, Erinne, RN  VTE Prevention/Management: (see MAR) other (see comments)  Intervention: Prevent Infection  Recent Flowsheet Documentation  Taken 2/2/2022 0600 by Thorn, Erinne, RN  Infection Prevention:   single patient room provided   rest/sleep promoted   hand hygiene promoted  Taken 2/2/2022 0400 by Thorn, Erinne, RN  Infection Prevention:   single patient room provided   rest/sleep  promoted   hand hygiene promoted  Taken 2/2/2022 0200 by Thorn, Erinne, RN  Infection Prevention:   single patient room provided   rest/sleep promoted   hand hygiene promoted  Taken 2/2/2022 0000 by Thorn, Erinne, RN  Infection Prevention:   single patient room provided   rest/sleep promoted   hand hygiene promoted  Taken 2/1/2022 2200 by Thorn, Erinne, RN  Infection Prevention:   single patient room provided   rest/sleep promoted   hand hygiene promoted  Taken 2/1/2022 2000 by Thorn, Erinne, RN  Infection Prevention:   single patient room provided   rest/sleep promoted   hand hygiene promoted  Goal: Optimal Comfort and Wellbeing  Outcome: Ongoing, Progressing  Intervention: Provide Person-Centered Care  Recent Flowsheet Documentation  Taken 2/1/2022 2045 by Thorn, Erinne, RN  Trust Relationship/Rapport:   care explained   questions encouraged  Goal: Readiness for Transition of Care  Outcome: Ongoing, Progressing     Problem: Chest Pain  Goal: Resolution of Chest Pain Symptoms  Outcome: Ongoing, Progressing   Goal Outcome Evaluation:  Plan of Care Reviewed With: patient        Progress: no change  Outcome Summary: no acute distress noted this shift, pt is on RA and up ad lazaro. No C/O pain or chest discomfort. Will continue to monitor

## 2022-02-02 NOTE — PLAN OF CARE
Problem: Adult Inpatient Plan of Care  Goal: Plan of Care Review  Outcome: Ongoing, Progressing  Flowsheets  Taken 2/2/2022 1852 by Maylin Gallegos, RN  Outcome Summary: Stress test completed and came back clear. Patient on room air and up ad lazaro. No chest pain or discomfort. Patient is to be discharged.  Taken 2/2/2022 0445 by Thorn, Erinne, RN  Progress: no change  Plan of Care Reviewed With: patient   Goal Outcome Evaluation:              Outcome Summary: Stress test completed and came back clear. Patient on room air and up ad lazaro. No chest pain or discomfort. Patient is to be discharged.

## 2022-02-02 NOTE — DISCHARGE SUMMARY
[facility]         HOSPITALIST  DISCHARGE SUMMARY    Patient Name: Dorie Head  : 1950  MRN: [id number]    Date of Admission: 2022  Date of Discharge: 2022  Primary Care Physician: Capri Pittman APRN    Consults     Date and Time Order Name Status Description    2022  8:32 AM Inpatient Hospitalist Consult      2022  7:00 AM Inpatient Cardiology Consult Completed           Active and Resolved Hospital Problems:  Active Hospital Problems    Diagnosis POA   • Cytokine release syndrome, grade 1 [D89.831] No   • COVID-19 [U07.1] Unknown   • Chest pain at rest [R07.9] Yes      Resolved Hospital Problems   No resolved problems to display.       Hospital Course     Hospital Course:  Dorie Head is a 71 y.o. female past medical history significant for COPD, hypertension, hyperlipidemia, skin cancer, and recent Covid diagnosis 7 days prior to presentation presented with numbness and tingling left upper extremity extending from the shoulder down to the left hand with associated chest heaviness occasional right shoulder pain, associated nausea diaphoresis and felt like she was going to pass out.  Initially started the day prior to presentation but resolved spontaneously returned on the morning of presentation and not improved so she presented the emergency department for further evaluation treatment.  In the emergency department patient is afebrile.  Sinus rhythm 60s to 90s on telemetry review.  Serum blood sugar slightly elevated to 127.  Serum potassium low at 3.4.  Creatinine elevated slightly to 1.12.  A1c within normal limits.  Lipid profile within normal limits.  CRP only modestly elevated.  Serum magnesium slightly low.  D-dimer within normal limits.  White blood cell count within normal limits.  Urinalysis negative for nitrates leukocytes with 1+ bacteria.  Covid detected by PCR.  Chest x-ray negative for acute infiltrate.  CT the C-spine demonstrated moderate to severe  degenerative changes but no acute cervical spine fractures.  CT the head within normal limits.  Repeat troponins within normal limits.  EKG sinus rhythm without ischemic changes.  Stress test consistent with low risk study.  Numbness and tingling in left upper extremity improved chest heaviness improved.  The symptoms thought secondary to infection with SARS-CoV-2 infection.  Patient satting on room air.  Patient seen and evaluated by myself on day discharge and thus stable for discharge home with the following instructions:    Chest pain  -Serial cardiac enzymes within normal limits  -Cardiac stress test within normal limits  -Likely thought to be musculoskeletal due to infection with COVID-19    COVID-19 infection  -Recommend self isolating for 10 days following onset of symptoms since you continue to have symptoms at day 7  -Follow-up with primary care provider next week for clearance to return to work    Hypokalemia  -Resolved with replacement    Hypomagnesemia  -Resolved with replacement    Nausea without vomiting  -Thought secondary to COVID-19 infection    COPD without acute exacerbation  -Continue home inhaler regimen    History of hypertension  -Blood pressures been within normal limits despite being off of amlodipine, carvedilol, hydrochlorothiazide, lisinopril  -Blood pressure slightly low on admission  -Hold home amlodipine, carvedilol, hydrochlorothiazide, lisinopril until follow-up with your primary care provider next week  -Keep a daily blood pressure log to present to your primary care provider on follow-up    History of hyperlipidemia  -Continue home pravastatin        Please follow-up your primary care provider next week.  Do not return to work until cleared to return to work by your primary care provider.  Please seek immediate medical attention for increased chest pain, palpitations, shortness of breath, fever, chills, nausea, vomiting, diarrhea.         DISCHARGE Follow Up Recommendations for  labs and diagnostics: As above      Day of Discharge     Vital Signs:  Temp:  [97.3 °F (36.3 °C)-97.9 °F (36.6 °C)] 97.9 °F (36.6 °C)  Heart Rate:  [57-73] 71  Resp:  [18] 18  BP: (111-153)/(50-75) 153/62  Physical Exam:   Gen. well-developed appearing stated age in no acute distress  HEENT: Normocephalic atraumatic moist membranes pupils equal round reactive light, no scleral icterus no conjunctival injection  Cardiovascular: regular rate and rhythm no murmurs rubs or gallops S1-S2, no lower extremity edema appreciated  Pulmonary: Clear to auscultation bilaterally no wheezes rales or rhonchi symmetric chest expansion, unlabored, no conversational dyspnea appreciated  Gastrointestinal: Soft nontender nondistended positive bowel sounds all 4 quadrants no rebound or guarding  Musculoskeletal: No clubbing cyanosis, warm and well-perfused, calves soft symmetric nontender bilaterally  Skin: Clean dry without rashs  Neuro: Cranial nerves II through XII intact grossly no sensorimotor deficits appreciated bilateral upper and lower extremities  Psych: Patient is calm cooperative and appropriate with exam not responding to internal stimuli  : No Pearson catheter no bladder distention no suprapubic tenderness      Discharge Details        Discharge Medications      Continue These Medications      Instructions Start Date   aspirin 81 MG EC tablet   81 mg, Oral, Daily      Budesonide 3 MG 24 hr capsule  Commonly known as: ENTOCORT EC   6 mg, Oral, 2 Times Daily      Cholecalciferol 50 MCG (2000 UT) capsule   2,000 Units, Oral, Daily      ezetimibe 10 MG tablet  Commonly known as: ZETIA   No dose, route, or frequency recorded.      pravastatin 40 MG tablet  Commonly known as: PRAVACHOL   40 mg, Oral, Daily         Stop These Medications    amLODIPine 10 MG tablet  Commonly known as: NORVASC     carvedilol 12.5 MG tablet  Commonly known as: COREG     hydroCHLOROthiazide 25 MG tablet  Commonly known as: HYDRODIURIL     lisinopril  40 MG tablet  Commonly known as: PRINIVIL,ZESTRIL            No Known Allergies    Discharge Disposition:  Home or Self Care    Diet:  Hospital:  Diet Order   Procedures   • Diet Regular; Cardiac       Discharge Activity:   Activity Instructions     Gradually Increase Activity Until at Pre-Hospitalization Level      Work Restrictions      After cleared to return to work by her PCP.    Type of Restriction: Work    May Return to Work: After Next Appointment    With / Without Restrictions: Without Restrictions          CODE STATUS:  Code Status and Medical Interventions:   Ordered at: 02/01/22 2212     Code Status (Patient has no pulse and is not breathing):    CPR (Attempt to Resuscitate)     Medical Interventions (Patient has pulse or is breathing):    Full Support         Future Appointments   Date Time Provider Department Center   4/18/2022  9:00 AM Shanna Jones APRN [facility]       Additional Instructions for the Follow-ups that You Need to Schedule     Discharge Follow-up with PCP   As directed       Currently Documented PCP:    Capri Pittman APRN    PCP Phone Number:    [phone]     Follow Up Details: Hospital discharge follow-up transition of care 5 to 7 days COVID-19, chest pain               Pertinent  and/or Most Recent Results     PROCEDURES:   None    LAB RESULTS:      Lab 02/01/22  0923 01/31/22  2325   WBC  --  7.63   HEMOGLOBIN  --  13.7   HEMATOCRIT  --  37.9   PLATELETS  --  276   NEUTROS ABS  --  5.28   IMMATURE GRANS (ABS)  --  0.01   LYMPHS ABS  --  1.54   MONOS ABS  --  0.52   EOS ABS  --  0.23   MCV  --  95.0   CRP 0.51*  --          Lab 02/02/22  0506 02/01/22  0923 01/31/22  2325   SODIUM 130* 136 135*   POTASSIUM 4.3 3.3* 3.4*   CHLORIDE 94* 96* 95*   CO2 25.1 27.0 28.4   ANION GAP 10.9 13.0 11.6   BUN 18 20 20   CREATININE 0.88 0.92 1.12*   GLUCOSE 88 125* 127*   CALCIUM 9.5 9.9 9.8   MAGNESIUM 2.0  --  1.8   HEMOGLOBIN A1C  --  5.41  --          Lab 01/31/22  2325   TOTAL  PROTEIN 7.4   ALBUMIN 4.30   GLOBULIN 3.1   ALT (SGPT) 7   AST (SGOT) 12   BILIRUBIN 0.4   ALK PHOS 68         Lab 02/02/22  0506 02/02/22  0028 02/01/22  0923 01/31/22 2325   TROPONIN T <0.010 <0.010 <0.010 <0.010         Lab 02/01/22  0923   CHOLESTEROL 120   LDL CHOL 59   HDL CHOL 41   TRIGLYCERIDES 108             Brief Urine Lab Results  (Last result in the past 365 days)      Color   Clarity   Blood   Leuk Est   Nitrite   Protein   CREAT   Urine HCG        02/01/22 0510 Yellow   Clear   Trace   Negative   Negative   Trace               Microbiology Results (last 10 days)     Procedure Component Value - Date/Time    COVID-19,APTIMA PANTHER(FLAQUITA),[facility], NP/OP SWAB IN UTM/VTM/SALINE TRANSPORT MEDIA,24 HR TAT - Swab, Nasopharynx [[id number]]  (Abnormal) Collected: 01/31/22 2320    Lab Status: Final result Specimen: Swab from Nasopharynx Updated: 02/01/22 0554     COVID19 Detected    Narrative:      Fact sheet for providers: https://www.fda.gov/media/489192/download     Fact sheet for patients: https://www.fda.gov/media/248678/download    Test performed by RT PCR.          CT Head Without Contrast    Result Date: 2/1/2022  Impression:   No acute brain abnormality is seen.     MIKY RICE JR, MD       Electronically Signed and Approved By: MIKY RICE JR, MD on 2/01/2022 at 5:27             CT Cervical Spine Without Contrast    Result Date: 2/1/2022  Impression:   No acute cervical spine fracture is seen.  Moderate to severe degenerative changes are present as discussed.     MIKY RICE JR, MD       Electronically Signed and Approved By: MIKY RICE JR, MD on 2/01/2022 at 5:33             XR Chest 1 View    Result Date: 2/1/2022  Impression:   No acute infiltrate is appreciated.      MIKY RICE JR, MD       Electronically Signed and Approved By: MIKY RICE JR, MD on 2/01/2022 at 2:04                       Results for orders placed during the hospital encounter of 02/01/22    Adult  Transthoracic Echo Limited W/ Cont if Necessary Per Protocol    Interpretation Summary  · Left ventricular diastolic function was normal. The calculation fraction 65%  · Calculated left ventricular EF = 65% Estimated left ventricular EF was in agreement with the calculated left ventricular EF.      Labs Pending at Discharge:        Time spent on Discharge including face to face service: Greater than 35 minutes    Electronically signed by Gabe Faria MD, 02/02/22, 6:43 PM EST.

## 2022-02-02 NOTE — PROGRESS NOTES
[facility]   Progress Note    Patient Name: Dorie Head  : 1950  MRN: [id number]  Primary Care Physician: Capri Pittman APRN  Date of admission: 2022    Subjective   Subjective     HPI:  Patient Reports no complaint no chest pain or shortness of breath still has some cough, runny nose telemetry sinus rhythm, echocardiogram normal LV function, nuclear study showed low probability of ischemia normal LV function.    Review of Systems  Review of Systems   Constitutional: Negative for activity change, appetite change and fatigue.   HENT: Negative for congestion, nosebleeds and trouble swallowing.    Eyes: Negative for discharge.   Respiratory: Negative for chest tightness.    Cardiovascular: Negative for chest pain and palpitations.   Gastrointestinal: Negative for abdominal distention and abdominal pain.   Endocrine: Negative for cold intolerance.   Genitourinary: Negative for difficulty urinating and hematuria.   Musculoskeletal: Negative for arthralgias.   Skin: Negative for pallor.   Neurological: Negative for dizziness and syncope.   Hematological: Negative for adenopathy.   Psychiatric/Behavioral: Negative for agitation.       Objective   Objective     Vitals:  Temp:  [97.3 °F (36.3 °C)-97.9 °F (36.6 °C)] 97.9 °F (36.6 °C)  Heart Rate:  [57-73] 72  Resp:  [18] 18  BP: (111-122)/(50-75) 121/51    Physical Exam:  Physical Exam  Constitutional:       Appearance: Normal appearance.   HENT:      Head: Normocephalic and atraumatic.      Nose: Nose normal. No congestion.   Cardiovascular:      Rate and Rhythm: Normal rate.      Pulses: Normal pulses.   Pulmonary:      Effort: Pulmonary effort is normal.   Abdominal:      Palpations: Abdomen is soft.   Musculoskeletal:         General: Normal range of motion.   Skin:     General: Skin is warm.   Neurological:      General: No focal deficit present.      Mental Status: She is alert and oriented to person, place, and time.   Psychiatric:         Mood and  Affect: Mood normal.         Result Review    Result Review:  I have personally reviewed the results from the time of this admission to 02/02/22 2:41 PM EST and agree with these findings:  [x]  Laboratory  [x]  Microbiology  [x]  Radiology  [x]  EKG/Telemetry   []  Cardiology/Vascular   []  Pathology  []  Old records  []  Other:    Most notable findings include: Atypical left arm discomfort, may be related to radiculopathy    Assessment/Plan   Assessment / Plan     Active Hospital Problems:  Active Hospital Problems    Diagnosis    • Chest pain at rest        Plan:   DC planning as per internal medicine    DVT prophylaxis: As per internal medicine    CODE STATUS:    Code Status and Medical Interventions:   Ordered at: 02/01/22 2212     Code Status (Patient has no pulse and is not breathing):    CPR (Attempt to Resuscitate)     Medical Interventions (Patient has pulse or is breathing):    Full Support       Disposition:  I expect patient to be discharged possible today.    Electronically signed by Lucius Barba MD, 02/02/22, 2:41 PM EST.

## 2022-02-03 ENCOUNTER — READMISSION MANAGEMENT (OUTPATIENT)
Dept: CALL CENTER | Facility: HOSPITAL | Age: 72
End: 2022-02-03

## 2022-02-03 NOTE — OUTREACH NOTE
COVID-19 Week 1 Survey      Responses   [facility] patient discharged from? [facility]   Does the patient have one of the following disease processes/diagnoses(primary or secondary)? COVID-19   COVID-19 underlying condition? None   Call Number Call 1   Week 1 Call successful? Yes   Call start time 0926   Call end time 0934   Discharge diagnosis Covid 19   Person spoke with today (if not patient) and relationship Hector, roommate   Meds reviewed with patient/caregiver? Yes   Is the patient having any side effects they believe may be caused by any medication additions or changes? No   Does the patient have all medications ordered at discharge? N/A   Is the patient taking all medications as directed (includes completed medication regime)? Yes   Does the patient have a primary care provider?  Yes   Does the patient have an appointment with their PCP or specialist within 7 days of discharge? Greater than 7 days   What is preventing the patient from scheduling follow up appointments within 7 days of discharge? Earlier appointment not available   Nursing Interventions Educated patient on importance of making appointment,  Advised patient to make appointment   Has the patient kept scheduled appointments due by today? N/A   Psychosocial issues? No   Did the patient receive a copy of their discharge instructions? Yes   Did the patient receive a copy of COVID-19 specific instructions? Yes   Nursing interventions Reviewed instructions with patient   What is the patient's perception of their health status since discharge? Same   Does the patient have any of the following symptoms? Cough,  Shortness of breath,  Loss of taste/smell   Nursing Interventions Nurse provided patient education   Pulse Ox monitoring None   Is the patient/caregiver able to teach back steps to recovery at home? Rest and rebuild strength, gradually increase activity,  Eat a well-balance diet   If the patient is a current smoker, are they able to teach back  resources for cessation? [id number]   Is the patient/caregiver able to teach back the hierarchy of who to call/visit for symptoms/problems? PCP, Specialist, Home health nurse, Urgent Care, ED, 911 Yes   COVID-19 call completed? Yes   Wrap up additional comments Hector, roommate, states pt has a cough, SOB, and loss of taste/smell. Hector states he is going to help pt get in with his PCP since pt's PCP will not see her for 21 days. Questions/concerns addressed.          Miranda Godfrey RN

## 2022-02-03 NOTE — PLAN OF CARE
Problem: Adult Inpatient Plan of Care  Goal: Plan of Care Review  2/2/2022 2015 by Thorn, Erinne, RN  Outcome: Met  Flowsheets (Taken 2/2/2022 2015)  Progress: improving  Plan of Care Reviewed With: patient  Outcome Summary: Pt is to D/C home, stress test negative and ECHO WDL. Pt is calling for a ride from family. No distress noted.  2/2/2022 0730 by Thorn, Erinne, RN  Outcome: Ongoing, Progressing  Flowsheets (Taken 2/2/2022 0445)  Progress: no change  Plan of Care Reviewed With: patient  Outcome Summary: no acute distress noted this shift, pt is on RA and up ad lazaro. No C/O pain or chest discomfort. Will continue to monitor  Goal: Patient-Specific Goal (Individualized)  2/2/2022 2015 by Thorn, Erinne, RN  Outcome: Met  2/2/2022 0730 by Thorn, Erinne, RN  Outcome: Ongoing, Progressing  Goal: Absence of Hospital-Acquired Illness or Injury  2/2/2022 2015 by Thorn, Erinne, RN  Outcome: Met  2/2/2022 0730 by Thorn, Erinne, RN  Outcome: Ongoing, Progressing  Goal: Optimal Comfort and Wellbeing  2/2/2022 2015 by Thorn, Erinne, RN  Outcome: Met  2/2/2022 0730 by Thorn, Erinne, RN  Outcome: Ongoing, Progressing  Goal: Readiness for Transition of Care  2/2/2022 2015 by Thorn, Erinne, RN  Outcome: Met  2/2/2022 0730 by Thorn, Erinne, RN  Outcome: Ongoing, Progressing     Problem: Chest Pain  Goal: Resolution of Chest Pain Symptoms  2/2/2022 2015 by Thorn, Erinne, RN  Outcome: Met  2/2/2022 0730 by Thorn, Erinne, RN  Outcome: Ongoing, Progressing     Problem: Cardiac Impairment  Goal: Optimal Activity Tolerance  Outcome: Met   Goal Outcome Evaluation:  Plan of Care Reviewed With: patient        Progress: improving  Outcome Summary: Pt is to D/C home, stress test negative and ECHO WDL. Pt is calling for a ride from family. No distress noted.

## 2022-02-03 NOTE — OUTREACH NOTE
Prep Survey      Responses   [facility] discharged from? [facility]   Is LACE score < 7 ? Yes   Emergency Room discharge w/ pulse ox? No   Eligibility Readm Mgmt   Discharge diagnosis Covid 19   Does the patient have one of the following disease processes/diagnoses(primary or secondary)? COVID-19   Does the patient have Home health ordered? No   Is there a DME ordered? No   Prep survey completed? Yes          Carine Patel RN

## 2022-02-04 ENCOUNTER — READMISSION MANAGEMENT (OUTPATIENT)
Dept: CALL CENTER | Facility: HOSPITAL | Age: 72
End: 2022-02-04

## 2022-02-04 NOTE — OUTREACH NOTE
COVID-19 Week 1 Survey      Responses   [facility] patient discharged from? [facility]   Does the patient have one of the following disease processes/diagnoses(primary or secondary)? COVID-19   COVID-19 underlying condition? None   Call Number Call 2   Week 1 Call successful? Yes   Call start time 1132   Call end time 1140   Discharge diagnosis Covid 19   Meds reviewed with patient/caregiver? Yes   Is the patient taking all medications as directed (includes completed medication regime)? Yes   Does the patient have a primary care provider?  Yes   Comments regarding PCP 2/7/22 FU with PCP   Does the patient have an appointment with their PCP or specialist within 7 days of discharge? Yes   Has the patient kept scheduled appointments due by today? N/A   Psychosocial issues? No   Did the patient receive a copy of their discharge instructions? Yes   Did the patient receive a copy of COVID-19 specific instructions? Yes   Nursing interventions Reviewed instructions with patient   What is the patient's perception of their health status since discharge? Same   Does the patient have any of the following symptoms? Loss of taste/smell  [Fatigue , Nausea, no energy]   Nursing Interventions Nurse provided patient education   Pulse Ox monitoring None   Is the patient/caregiver able to teach back steps to recovery at home? Rest and rebuild strength, gradually increase activity,  Eat a well-balance diet   If the patient is a current smoker, are they able to teach back resources for cessation? [id number]  [cut back ]   Is the patient/caregiver able to teach back the hierarchy of who to call/visit for symptoms/problems? PCP, Specialist, Home health nurse, Urgent Care, ED, 911 Yes   COVID-19 call completed? Yes   Wrap up additional comments pt reports that she will see the DR Monday. Still has heaviness in arm. Feels very fatigued and has nausea.           Aubree Stephens, RN

## 2022-02-05 ENCOUNTER — READMISSION MANAGEMENT (OUTPATIENT)
Dept: CALL CENTER | Facility: HOSPITAL | Age: 72
End: 2022-02-05

## 2022-02-05 NOTE — OUTREACH NOTE
COVID-19 Week 1 Survey      Responses   [facility] patient discharged from? [facility]   Does the patient have one of the following disease processes/diagnoses(primary or secondary)? COVID-19   COVID-19 underlying condition? None   Call Number Call 3   Week 1 Call successful? No   Discharge diagnosis Covid 19          Mary Trevino RN

## 2022-02-09 ENCOUNTER — READMISSION MANAGEMENT (OUTPATIENT)
Dept: CALL CENTER | Facility: HOSPITAL | Age: 72
End: 2022-02-09

## 2022-02-09 NOTE — OUTREACH NOTE
COVID-19 Week 2 Survey      Responses   [facility] [place] patient discharged from? [facility]   Does the patient have one of the following disease processes/diagnoses(primary or secondary)? COVID-19   COVID-19 underlying condition? None   Call Number Call 1   COVID-19 Week 2: Call 1 attempt successful? Yes   Call start time 1201   Call end time 1204   Discharge diagnosis Covid 19   Has the patient kept scheduled appointments due by today? Yes   Has home health visited the patient within 72 hours of discharge? N/A   Psychosocial issues? No   Did the patient receive a copy of their discharge instructions? Yes   Did the patient receive a copy of COVID-19 specific instructions? Yes   Nursing interventions Reviewed instructions with patient   What is the patient's perception of their health status since discharge? Improving  [leg weakness,nausea]   Does the patient have any of the following symptoms? Cough,  Loss of taste/smell   Nursing Interventions Nurse provided patient education   Pulse Ox monitoring None   Is the patient/caregiver able to teach back steps to recovery at home? Set small, achievable goals for return to baseline health,  Rest and rebuild strength, gradually increase activity,  Eat a well-balance diet   Is the patient/caregiver able to teach back the hierarchy of who to call/visit for symptoms/problems? PCP, Specialist, Home health nurse, Urgent Care, ED, 911 Yes   COVID-19 call completed? Yes          Martina Smith RN

## 2022-05-10 ENCOUNTER — OFFICE VISIT (OUTPATIENT)
Dept: FAMILY MEDICINE CLINIC | Facility: CLINIC | Age: 72
End: 2022-05-10

## 2022-05-10 ENCOUNTER — LAB (OUTPATIENT)
Dept: LAB | Facility: HOSPITAL | Age: 72
End: 2022-05-10

## 2022-05-10 VITALS
HEART RATE: 64 BPM | DIASTOLIC BLOOD PRESSURE: 81 MMHG | WEIGHT: 120.2 LBS | OXYGEN SATURATION: 97 % | HEIGHT: 69 IN | BODY MASS INDEX: 17.8 KG/M2 | TEMPERATURE: 97.8 F | SYSTOLIC BLOOD PRESSURE: 143 MMHG

## 2022-05-10 DIAGNOSIS — E78.5 HYPERLIPIDEMIA, UNSPECIFIED HYPERLIPIDEMIA TYPE: ICD-10-CM

## 2022-05-10 DIAGNOSIS — R10.13 EPIGASTRIC PAIN: ICD-10-CM

## 2022-05-10 DIAGNOSIS — M79.604 PAIN IN BOTH LOWER EXTREMITIES: ICD-10-CM

## 2022-05-10 DIAGNOSIS — R11.0 NAUSEA: ICD-10-CM

## 2022-05-10 DIAGNOSIS — M79.605 PAIN IN BOTH LOWER EXTREMITIES: ICD-10-CM

## 2022-05-10 DIAGNOSIS — I10 PRIMARY HYPERTENSION: ICD-10-CM

## 2022-05-10 LAB
ALBUMIN SERPL-MCNC: 4.2 G/DL (ref 3.5–5.2)
ALBUMIN/GLOB SERPL: 1.3 G/DL
ALP SERPL-CCNC: 72 U/L (ref 39–117)
ALT SERPL W P-5'-P-CCNC: 10 U/L (ref 1–33)
AMYLASE SERPL-CCNC: 76 U/L (ref 28–100)
ANION GAP SERPL CALCULATED.3IONS-SCNC: 9.8 MMOL/L (ref 5–15)
AST SERPL-CCNC: 14 U/L (ref 1–32)
BILIRUB SERPL-MCNC: 0.3 MG/DL (ref 0–1.2)
BUN SERPL-MCNC: 9 MG/DL (ref 8–23)
BUN/CREAT SERPL: 9.4 (ref 7–25)
CALCIUM SPEC-SCNC: 10.3 MG/DL (ref 8.6–10.5)
CHLORIDE SERPL-SCNC: 104 MMOL/L (ref 98–107)
CHOLEST SERPL-MCNC: 122 MG/DL (ref 0–200)
CO2 SERPL-SCNC: 26.2 MMOL/L (ref 22–29)
CREAT SERPL-MCNC: 0.96 MG/DL (ref 0.57–1)
DEPRECATED RDW RBC AUTO: 43.8 FL (ref 37–54)
EGFRCR SERPLBLD CKD-EPI 2021: 64.2 ML/MIN/1.73
ERYTHROCYTE [DISTWIDTH] IN BLOOD BY AUTOMATED COUNT: 11.9 % (ref 12.3–15.4)
GLOBULIN UR ELPH-MCNC: 3.2 GM/DL
GLUCOSE SERPL-MCNC: 80 MG/DL (ref 65–99)
HCT VFR BLD AUTO: 42.6 % (ref 34–46.6)
HDLC SERPL-MCNC: 58 MG/DL (ref 40–60)
HGB BLD-MCNC: 14.3 G/DL (ref 12–15.9)
LDLC SERPL CALC-MCNC: 51 MG/DL (ref 0–100)
LDLC/HDLC SERPL: 0.9 {RATIO}
LIPASE SERPL-CCNC: 53 U/L (ref 13–60)
MAGNESIUM SERPL-MCNC: 2 MG/DL (ref 1.6–2.4)
MCH RBC QN AUTO: 33.6 PG (ref 26.6–33)
MCHC RBC AUTO-ENTMCNC: 33.6 G/DL (ref 31.5–35.7)
MCV RBC AUTO: 100 FL (ref 79–97)
PLATELET # BLD AUTO: 261 10*3/MM3 (ref 140–450)
PMV BLD AUTO: 10.3 FL (ref 6–12)
POTASSIUM SERPL-SCNC: 4.6 MMOL/L (ref 3.5–5.2)
PROT SERPL-MCNC: 7.4 G/DL (ref 6–8.5)
RBC # BLD AUTO: 4.26 10*6/MM3 (ref 3.77–5.28)
SODIUM SERPL-SCNC: 140 MMOL/L (ref 136–145)
TRIGL SERPL-MCNC: 59 MG/DL (ref 0–150)
VLDLC SERPL-MCNC: 13 MG/DL (ref 5–40)
WBC NRBC COR # BLD: 7.37 10*3/MM3 (ref 3.4–10.8)

## 2022-05-10 PROCEDURE — 83690 ASSAY OF LIPASE: CPT

## 2022-05-10 PROCEDURE — 80053 COMPREHEN METABOLIC PANEL: CPT

## 2022-05-10 PROCEDURE — 36415 COLL VENOUS BLD VENIPUNCTURE: CPT

## 2022-05-10 PROCEDURE — 82150 ASSAY OF AMYLASE: CPT

## 2022-05-10 PROCEDURE — 99204 OFFICE O/P NEW MOD 45 MIN: CPT | Performed by: NURSE PRACTITIONER

## 2022-05-10 PROCEDURE — 83735 ASSAY OF MAGNESIUM: CPT

## 2022-05-10 PROCEDURE — 80061 LIPID PANEL: CPT

## 2022-05-10 PROCEDURE — 85027 COMPLETE CBC AUTOMATED: CPT

## 2022-05-10 RX ORDER — PANTOPRAZOLE SODIUM 40 MG/1
40 TABLET, DELAYED RELEASE ORAL DAILY
Qty: 90 TABLET | Refills: 0 | Status: SHIPPED | OUTPATIENT
Start: 2022-05-10 | End: 2022-05-13 | Stop reason: ALTCHOICE

## 2022-05-10 RX ORDER — AMLODIPINE BESYLATE 10 MG/1
10 TABLET ORAL DAILY
Qty: 90 TABLET | Refills: 1 | Status: SHIPPED | OUTPATIENT
Start: 2022-05-10 | End: 2022-07-26

## 2022-05-10 RX ORDER — AMLODIPINE BESYLATE 5 MG/1
5 TABLET ORAL DAILY
COMMUNITY
End: 2022-05-10 | Stop reason: SDUPTHER

## 2022-05-10 RX ORDER — CARVEDILOL 12.5 MG/1
12.5 TABLET ORAL 2 TIMES DAILY WITH MEALS
COMMUNITY
End: 2022-07-26

## 2022-05-10 NOTE — PROGRESS NOTES
"Chief Complaint  Hypertension and Establish Care    Subjective          Dorie Head presents to [facility]  Patient presents to the office today to establish care.    She states that she has been on short-term disability after tan COVID-19 in earlier the year.  Patient states that she has not felt right and continues to have fatigue as well as arm pain and nausea.  She does see gastroenterology regarding her colitis.  She had to reschedule her appointment and is rescheduled for September now.  Patient states that she continues to have nausea on a daily basis and states that it occurs after she wakes up.  He states that this is what is keeping her from being able to work.  Pressure is 143/81.  She states her blood pressure has been running higher.  I did discuss increasing her amlodipine to 10 mg daily.  Also discussed obtaining lab work on [place] for further evaluation.  She does state that she has had a bilateral nail fungus on her great toes but states that her previous PCP would not treat it.  I explained to the patient that once we receive her lab work that I will be happy to send medication in for her.  I explained that we would continue her FMLA for 1 month to see if the medication improves her symptoms and would allow us time to review her lab work as well.  Denies any other concerns or complaints at this time      Objective   Vital Signs:  /81 (BP Location: Right arm, Patient Position: Sitting, Cuff Size: Adult)   Pulse 64   Temp 97.8 °F (36.6 °C) (Temporal)   Ht 175.3 cm (69.02\")   Wt 54.5 kg (120 lb 3.2 oz)   SpO2 97%   BMI 17.74 kg/m²           Physical Exam  Vitals reviewed.   Constitutional:       Appearance: Normal appearance.   Cardiovascular:      Rate and Rhythm: Normal rate and regular rhythm.      Pulses: Normal pulses.      Heart sounds: Normal heart sounds, S1 normal and S2 normal. No murmur heard.  Pulmonary:      Effort: Pulmonary effort is " normal. No respiratory distress.      Breath sounds: Normal breath sounds.   Skin:     General: Skin is warm and dry.   Neurological:      Mental Status: She is alert and oriented to person, place, and time.   Psychiatric:         Attention and Perception: Attention normal.         Mood and Affect: Mood normal.         Behavior: Behavior normal.        Result Review :                Assessment and Plan    Diagnoses and all orders for this visit:    1. Primary hypertension  -     CBC (No Diff); Future  -     Comprehensive Metabolic Panel; Future  -     amLODIPine (NORVASC) 10 MG tablet; Take 1 tablet by mouth Daily. Take one time daily  Dispense: 90 tablet; Refill: 1    2. Hyperlipidemia, unspecified hyperlipidemia type  -     Lipid Panel; Future    3. Epigastric pain  -     Amylase; Future  -     Lipase; Future  -     pantoprazole (Protonix) 40 MG EC tablet; Take 1 tablet by mouth Daily.  Dispense: 90 tablet; Refill: 0    4. Nausea  -     Amylase; Future  -     Lipase; Future  -     pantoprazole (Protonix) 40 MG EC tablet; Take 1 tablet by mouth Daily.  Dispense: 90 tablet; Refill: 0    5. Pain in both lower extremities  -     Magnesium; Future      FMLA paperwork was also completed for the patient to extend for the next 30 days.       Follow Up   Return in about 1 month (around 6/10/2022) for Recheck.  Patient was given instructions and counseling regarding her condition or for health maintenance advice. Please see specific information pulled into the AVS if appropriate.

## 2022-05-12 ENCOUNTER — TELEPHONE (OUTPATIENT)
Dept: FAMILY MEDICINE CLINIC | Facility: CLINIC | Age: 72
End: 2022-05-12

## 2022-05-12 RX ORDER — TERBINAFINE HYDROCHLORIDE 250 MG/1
250 TABLET ORAL DAILY
Qty: 30 TABLET | Refills: 0 | Status: SHIPPED | OUTPATIENT
Start: 2022-05-12 | End: 2022-07-25 | Stop reason: ALTCHOICE

## 2022-05-12 NOTE — TELEPHONE ENCOUNTER
Patient is requesting a re-certification for when to return back to work. She states that was one of the forms dropped off with [facility] and it was not included in what she picked up.     Patient is asking us to e-mail the form to her when its completed.   Advised that Nahun was out of office and would be back tomorrow.

## 2022-05-12 NOTE — TELEPHONE ENCOUNTER
Caller: Dorie Head    Relationship: Self    Best call back number: [phone]    What medication are you requesting: UNKNOWN    What are your current symptoms: NAUSEA       Have you had these symptoms before:    [x] Yes  [] No    Have you been treated for these symptoms before:   [x] Yes  [] No    If a prescription is needed, what is your preferred pharmacy and phone number: [address] [phone] [facility] [phone] [facility]     Additional notes: PATIENT'S INSURANCE DOES NOT COVER THE pantoprazole (Protonix) 40 MG EC tablet SHE IS REQUESTING AN ALTERNATIVE MEDICATION THAT IS COVERED BY HER INSURANCE PLEASE CONTACT AND ADVISE

## 2022-05-13 RX ORDER — OMEPRAZOLE 40 MG/1
40 CAPSULE, DELAYED RELEASE ORAL DAILY
Qty: 90 CAPSULE | Refills: 0 | Status: SHIPPED | OUTPATIENT
Start: 2022-05-13 | End: 2022-06-13

## 2022-05-13 NOTE — TELEPHONE ENCOUNTER
The paperwork she is asking for is scanned into the chart. Sent pt a [employer] message that she can come pick it up.

## 2022-05-20 RX ORDER — SUCRALFATE 1 G/1
1 TABLET ORAL 4 TIMES DAILY
Qty: 40 TABLET | Refills: 0 | Status: SHIPPED | OUTPATIENT
Start: 2022-05-20 | End: 2022-05-31 | Stop reason: SDUPTHER

## 2022-05-31 RX ORDER — SUCRALFATE ORAL 1 G/10ML
1 SUSPENSION ORAL 4 TIMES DAILY
Qty: 414 ML | Refills: 0 | Status: SHIPPED | OUTPATIENT
Start: 2022-05-31 | End: 2022-07-25 | Stop reason: ALTCHOICE

## 2022-06-13 RX ORDER — AMLODIPINE BESYLATE AND ATORVASTATIN CALCIUM 5; 10 MG/1; MG/1
1 TABLET, FILM COATED ORAL DAILY
COMMUNITY
End: 2022-07-26 | Stop reason: ALTCHOICE

## 2022-06-14 ENCOUNTER — TELEPHONE (OUTPATIENT)
Dept: ONCOLOGY | Facility: OTHER | Age: 72
End: 2022-06-14

## 2022-06-14 ENCOUNTER — OFFICE VISIT (OUTPATIENT)
Dept: FAMILY MEDICINE CLINIC | Facility: CLINIC | Age: 72
End: 2022-06-14

## 2022-06-14 VITALS
BODY MASS INDEX: 17.86 KG/M2 | WEIGHT: 120.6 LBS | HEIGHT: 69 IN | DIASTOLIC BLOOD PRESSURE: 68 MMHG | TEMPERATURE: 97.5 F | OXYGEN SATURATION: 97 % | HEART RATE: 69 BPM | SYSTOLIC BLOOD PRESSURE: 142 MMHG

## 2022-06-14 DIAGNOSIS — G93.32 COVID-19 LONG HAULER MANIFESTING CHRONIC FATIGUE: ICD-10-CM

## 2022-06-14 DIAGNOSIS — R10.13 EPIGASTRIC PAIN: Primary | ICD-10-CM

## 2022-06-14 DIAGNOSIS — R53.83 FATIGUE, UNSPECIFIED TYPE: ICD-10-CM

## 2022-06-14 DIAGNOSIS — R53.1 WEAKNESS: ICD-10-CM

## 2022-06-14 DIAGNOSIS — U09.9 COVID-19 LONG HAULER MANIFESTING CHRONIC FATIGUE: ICD-10-CM

## 2022-06-14 DIAGNOSIS — R11.0 NAUSEA: ICD-10-CM

## 2022-06-14 PROCEDURE — 99213 OFFICE O/P EST LOW 20 MIN: CPT | Performed by: NURSE PRACTITIONER

## 2022-06-14 NOTE — PROGRESS NOTES
"Chief Complaint  Nausea (One month follow up)    Subjective        Dorie Head presents to [facility]  Presents to the office today for 1 month follow-up regarding her symptoms of nausea general fatigue weakness epigastric pain.  Patient states the symptoms have been persistent since February after she was diagnosed and admitted to the hospital with COVID-19.  Patient has had some lab work completed which was unremarkable.  She states that she has having a difficult time standing or sitting for extended periods of time.  She states that the fatigue is so great that she has to lay down she states that patient does currently work at [employer] and [employer].   Patient states that she is unable to even go to the grocery store at this point.  He is following up with cardiology for further work-up has an appointment with gastroenterology.  Patient states that she has to work as she not live off Social Security.  Did discuss doing further testing to see if we can determine the etiology.  I also explained to the patient that this may be symptoms of prolonged COVID-19.    Nausea  Associated symptoms include nausea.       Objective   Vital Signs:  /68 (BP Location: Right arm, Patient Position: Sitting, Cuff Size: Adult)   Pulse 69   Temp 97.5 °F (36.4 °C) (Temporal)   Ht 175.3 cm (69.02\")   Wt 54.7 kg (120 lb 9.6 oz)   SpO2 97%   BMI 17.80 kg/m²   Estimated body mass index is 17.8 kg/m² as calculated from the following:    Height as of this encounter: 175.3 cm (69.02\").    Weight as of this encounter: 54.7 kg (120 lb 9.6 oz).          Physical Exam  Vitals reviewed.   Constitutional:       Appearance: Normal appearance.   HENT:      Right Ear: Decreased hearing noted.      Left Ear: Decreased hearing noted.   Cardiovascular:      Rate and Rhythm: Normal rate and regular rhythm.      Pulses: Normal pulses.      Heart sounds: Normal heart sounds, S1 normal and S2 normal. " No murmur heard.  Pulmonary:      Effort: Pulmonary effort is normal. No respiratory distress.      Breath sounds: Normal breath sounds.   Skin:     General: Skin is warm and dry.   Neurological:      Mental Status: She is alert and oriented to person, place, and time.   Psychiatric:         Attention and Perception: Attention normal.         Mood and Affect: Mood normal.         Behavior: Behavior normal.        Result Review :                Assessment and Plan   Diagnoses and all orders for this visit:    1. Epigastric pain (Primary)  -     CT Abdomen Pelvis Without Contrast; Future    2. Nausea  -     CT Abdomen Pelvis Without Contrast; Future    3. Weakness  -     CT Abdomen Pelvis Without Contrast; Future  -     Vitamin B12; Future  -     Vitamin D 25 hydroxy; Future  -     Iron and TIBC; Future  -     TSH; Future    4. Fatigue, unspecified type  -     Vitamin B12; Future  -     Vitamin D 25 hydroxy; Future  -     Iron and TIBC; Future  -     TSH; Future    5. COVID-19 long hauler manifesting chronic fatigue      FMLA paperwork was completed for the patient for the next month.       Follow Up   Return in about 1 month (around 7/14/2022) for Recheck.  Patient was given instructions and counseling regarding her condition or for health maintenance advice. Please see specific information pulled into the AVS if appropriate.

## 2022-06-17 ENCOUNTER — LAB (OUTPATIENT)
Dept: LAB | Facility: HOSPITAL | Age: 72
End: 2022-06-17

## 2022-06-17 DIAGNOSIS — R53.83 FATIGUE, UNSPECIFIED TYPE: ICD-10-CM

## 2022-06-17 DIAGNOSIS — R53.1 WEAKNESS: ICD-10-CM

## 2022-06-17 LAB
25(OH)D3 SERPL-MCNC: 41.3 NG/ML (ref 30–100)
IRON 24H UR-MRATE: 107 MCG/DL (ref 37–145)
IRON SATN MFR SERPL: 26 % (ref 20–50)
TIBC SERPL-MCNC: 414 MCG/DL (ref 298–536)
TRANSFERRIN SERPL-MCNC: 278 MG/DL (ref 200–360)
TSH SERPL DL<=0.05 MIU/L-ACNC: 1.03 UIU/ML (ref 0.27–4.2)
VIT B12 BLD-MCNC: 291 PG/ML (ref 211–946)

## 2022-06-17 PROCEDURE — 84443 ASSAY THYROID STIM HORMONE: CPT

## 2022-06-17 PROCEDURE — 36415 COLL VENOUS BLD VENIPUNCTURE: CPT

## 2022-06-17 PROCEDURE — 83540 ASSAY OF IRON: CPT

## 2022-06-17 PROCEDURE — 82607 VITAMIN B-12: CPT

## 2022-06-17 PROCEDURE — 82306 VITAMIN D 25 HYDROXY: CPT

## 2022-06-17 PROCEDURE — 84466 ASSAY OF TRANSFERRIN: CPT

## 2022-07-11 ENCOUNTER — HOSPITAL ENCOUNTER (OUTPATIENT)
Dept: CT IMAGING | Facility: HOSPITAL | Age: 72
Discharge: HOME | End: 2022-07-11
Admitting: NURSE PRACTITIONER

## 2022-07-11 DIAGNOSIS — R10.13 EPIGASTRIC PAIN: ICD-10-CM

## 2022-07-11 DIAGNOSIS — R53.1 WEAKNESS: ICD-10-CM

## 2022-07-11 DIAGNOSIS — R11.0 NAUSEA: ICD-10-CM

## 2022-07-11 PROCEDURE — 74176 CT ABD & PELVIS W/O CONTRAST: CPT

## 2022-07-25 RX ORDER — LOSARTAN POTASSIUM 50 MG/1
50 TABLET ORAL DAILY
COMMUNITY
Start: 2022-06-20 | End: 2023-03-14 | Stop reason: SDUPTHER

## 2022-07-26 ENCOUNTER — OFFICE VISIT (OUTPATIENT)
Dept: FAMILY MEDICINE CLINIC | Facility: CLINIC | Age: 72
End: 2022-07-26

## 2022-07-26 VITALS
HEIGHT: 69 IN | WEIGHT: 122.8 LBS | OXYGEN SATURATION: 98 % | BODY MASS INDEX: 18.19 KG/M2 | HEART RATE: 68 BPM | SYSTOLIC BLOOD PRESSURE: 124 MMHG | DIASTOLIC BLOOD PRESSURE: 72 MMHG | TEMPERATURE: 97.1 F

## 2022-07-26 DIAGNOSIS — U09.9 COVID-19 LONG HAULER MANIFESTING CHRONIC FATIGUE: Primary | ICD-10-CM

## 2022-07-26 DIAGNOSIS — G93.32 COVID-19 LONG HAULER MANIFESTING CHRONIC FATIGUE: Primary | ICD-10-CM

## 2022-07-26 PROCEDURE — 99213 OFFICE O/P EST LOW 20 MIN: CPT | Performed by: NURSE PRACTITIONER

## 2022-07-26 RX ORDER — AMLODIPINE BESYLATE 5 MG/1
10 TABLET ORAL DAILY
COMMUNITY
Start: 2022-06-20 | End: 2022-08-30 | Stop reason: SDUPTHER

## 2022-07-26 RX ORDER — CARVEDILOL 6.25 MG/1
6.25 TABLET ORAL 2 TIMES DAILY
COMMUNITY
Start: 2022-06-20 | End: 2022-08-30 | Stop reason: SDUPTHER

## 2022-07-26 NOTE — PROGRESS NOTES
"Chief Complaint  Nausea (One month follow up/FMLA)    Subjective        Dorie Head presents to [facility]  Patient presents to the office today for 1 month follow-up regarding her fatigue and nausea.  Patient states that nausea has improved somewhat.  She states that she continues to have the generalized weakness and shortness of breath with activity.  Patient states that this all started with her COVID in January.  I discussed with the patient that all of her testing has been negative.  She could very well be experiencing long-haul or COVID symptoms.  I also discussed the patient needs to consider long term disability due to her symptoms not improving.  Since her nausea improved over the past month I did discuss bringing patient back in a month to further evaluate the overall fatigue and shortness of breath.  I also discussed other jobs that were not as physically demanding as her current job.        Objective   Vital Signs:  /72 (BP Location: Right arm, Patient Position: Sitting, Cuff Size: Adult)   Pulse 68   Temp 97.1 °F (36.2 °C) (Temporal)   Ht 175.3 cm (69.02\")   Wt 55.7 kg (122 lb 12.8 oz)   SpO2 98%   BMI 18.12 kg/m²   Estimated body mass index is 18.12 kg/m² as calculated from the following:    Height as of this encounter: 175.3 cm (69.02\").    Weight as of this encounter: 55.7 kg (122 lb 12.8 oz).    BMI is below normal parameters (malnutrition). Recommendations: none (medical contraindication)      Physical Exam  Vitals reviewed.   Constitutional:       Appearance: Normal appearance.   Cardiovascular:      Rate and Rhythm: Normal rate and regular rhythm.      Heart sounds: Normal heart sounds, S1 normal and S2 normal. No murmur heard.  Pulmonary:      Effort: Pulmonary effort is normal. No respiratory distress.      Breath sounds: Normal breath sounds.   Skin:     General: Skin is warm and dry.   Neurological:      Mental Status: She is alert and oriented to " person, place, and time.   Psychiatric:         Attention and Perception: Attention normal.         Mood and Affect: Mood normal.         Behavior: Behavior normal.        Result Review :                 Assessment and Plan   Diagnoses and all orders for this visit:    1. COVID-19 long hauler manifesting chronic fatigue (Primary)             Follow Up   Return in about 1 month (around 8/26/2022) for Recheck.  Patient was given instructions and counseling regarding her condition or for health maintenance advice. Please see specific information pulled into the AVS if appropriate.       Answers for HPI/ROS submitted by the patient on 7/20/2022  Please describe your symptoms.: Extreme nausea on waking and periodically throughout the day.  Headaches, weakness whenever I move around and try to do anything, heart starts to beat hard.  I have started to feel myself become short of breath doing even the tiniest thing.  Have you had these symptoms before?: Yes  How long have you been having these symptoms?: Greater than 2 weeks  Please list any medications you are currently taking for this condition.: None  Please describe any probable cause for these symptoms. : Unknown.  Simply just never felt right again after having covid.  What is the primary reason for your visit?: Other

## 2022-08-30 ENCOUNTER — TRANSCRIBE ORDERS (OUTPATIENT)
Dept: LAB | Facility: HOSPITAL | Age: 72
End: 2022-08-30

## 2022-08-30 ENCOUNTER — OFFICE VISIT (OUTPATIENT)
Dept: FAMILY MEDICINE CLINIC | Facility: CLINIC | Age: 72
End: 2022-08-30

## 2022-08-30 ENCOUNTER — LAB (OUTPATIENT)
Dept: LAB | Facility: HOSPITAL | Age: 72
End: 2022-08-30

## 2022-08-30 ENCOUNTER — TRANSCRIBE ORDERS (OUTPATIENT)
Dept: CARDIOLOGY | Facility: HOSPITAL | Age: 72
End: 2022-08-30

## 2022-08-30 VITALS
SYSTOLIC BLOOD PRESSURE: 124 MMHG | BODY MASS INDEX: 18.37 KG/M2 | HEART RATE: 83 BPM | HEIGHT: 69 IN | TEMPERATURE: 97.8 F | DIASTOLIC BLOOD PRESSURE: 66 MMHG | OXYGEN SATURATION: 96 % | WEIGHT: 124 LBS

## 2022-08-30 DIAGNOSIS — R06.02 SHORTNESS OF BREATH: ICD-10-CM

## 2022-08-30 DIAGNOSIS — E78.5 HYPERLIPIDEMIA, UNSPECIFIED HYPERLIPIDEMIA TYPE: ICD-10-CM

## 2022-08-30 DIAGNOSIS — R09.89 BRUIT: Primary | ICD-10-CM

## 2022-08-30 DIAGNOSIS — M25.50 POLYARTHRALGIA: ICD-10-CM

## 2022-08-30 DIAGNOSIS — R53.83 FATIGUE, UNSPECIFIED TYPE: ICD-10-CM

## 2022-08-30 DIAGNOSIS — I10 HYPERTENSION, UNSPECIFIED TYPE: ICD-10-CM

## 2022-08-30 DIAGNOSIS — E78.5 HYPERLIPIDEMIA, UNSPECIFIED HYPERLIPIDEMIA TYPE: Primary | ICD-10-CM

## 2022-08-30 DIAGNOSIS — M25.50 POLYARTHRALGIA: Primary | ICD-10-CM

## 2022-08-30 LAB
ALBUMIN SERPL-MCNC: 4.4 G/DL (ref 3.5–5.2)
ALBUMIN/GLOB SERPL: 1.6 G/DL
ALP SERPL-CCNC: 77 U/L (ref 39–117)
ALT SERPL W P-5'-P-CCNC: 13 U/L (ref 1–33)
ANION GAP SERPL CALCULATED.3IONS-SCNC: 12.1 MMOL/L (ref 5–15)
AST SERPL-CCNC: 18 U/L (ref 1–32)
BILIRUB SERPL-MCNC: 0.3 MG/DL (ref 0–1.2)
BUN SERPL-MCNC: 15 MG/DL (ref 8–23)
BUN/CREAT SERPL: 16.9 (ref 7–25)
CALCIUM SPEC-SCNC: 9.9 MG/DL (ref 8.6–10.5)
CHLORIDE SERPL-SCNC: 104 MMOL/L (ref 98–107)
CHOLEST SERPL-MCNC: 157 MG/DL (ref 0–200)
CHROMATIN AB SERPL-ACNC: 25.4 IU/ML (ref 0–14)
CO2 SERPL-SCNC: 26.9 MMOL/L (ref 22–29)
CREAT SERPL-MCNC: 0.89 MG/DL (ref 0.57–1)
CRP SERPL-MCNC: <0.3 MG/DL (ref 0–0.5)
EGFRCR SERPLBLD CKD-EPI 2021: 70.3 ML/MIN/1.73
EXPIRATION DATE: NORMAL
GLOBULIN UR ELPH-MCNC: 2.8 GM/DL
GLUCOSE SERPL-MCNC: 104 MG/DL (ref 65–99)
HBA1C MFR BLD: 5.2 %
HDLC SERPL-MCNC: 58 MG/DL (ref 40–60)
LDLC SERPL CALC-MCNC: 80 MG/DL (ref 0–100)
LDLC/HDLC SERPL: 1.35 {RATIO}
Lab: NORMAL
POTASSIUM SERPL-SCNC: 4.5 MMOL/L (ref 3.5–5.2)
PROT SERPL-MCNC: 7.2 G/DL (ref 6–8.5)
SODIUM SERPL-SCNC: 143 MMOL/L (ref 136–145)
TRIGL SERPL-MCNC: 104 MG/DL (ref 0–150)
URATE SERPL-MCNC: 4.5 MG/DL (ref 2.4–5.7)
VLDLC SERPL-MCNC: 19 MG/DL (ref 5–40)

## 2022-08-30 PROCEDURE — 86665 EPSTEIN-BARR CAPSID VCA: CPT

## 2022-08-30 PROCEDURE — 86431 RHEUMATOID FACTOR QUANT: CPT

## 2022-08-30 PROCEDURE — 80053 COMPREHEN METABOLIC PANEL: CPT

## 2022-08-30 PROCEDURE — 86645 CMV ANTIBODY IGM: CPT

## 2022-08-30 PROCEDURE — 86063 ANTISTREPTOLYSIN O SCREEN: CPT

## 2022-08-30 PROCEDURE — 86038 ANTINUCLEAR ANTIBODIES: CPT

## 2022-08-30 PROCEDURE — 86225 DNA ANTIBODY NATIVE: CPT

## 2022-08-30 PROCEDURE — 99213 OFFICE O/P EST LOW 20 MIN: CPT | Performed by: NURSE PRACTITIONER

## 2022-08-30 PROCEDURE — 83036 HEMOGLOBIN GLYCOSYLATED A1C: CPT | Performed by: NURSE PRACTITIONER

## 2022-08-30 PROCEDURE — 84550 ASSAY OF BLOOD/URIC ACID: CPT

## 2022-08-30 PROCEDURE — 86140 C-REACTIVE PROTEIN: CPT

## 2022-08-30 PROCEDURE — 86644 CMV ANTIBODY: CPT

## 2022-08-30 PROCEDURE — 80061 LIPID PANEL: CPT

## 2022-08-30 PROCEDURE — 36415 COLL VENOUS BLD VENIPUNCTURE: CPT

## 2022-08-30 RX ORDER — CARVEDILOL 12.5 MG/1
12.5 TABLET ORAL 2 TIMES DAILY
COMMUNITY
Start: 2022-08-09 | End: 2022-09-27 | Stop reason: SDUPTHER

## 2022-08-30 RX ORDER — AMLODIPINE BESYLATE 10 MG/1
10 TABLET ORAL DAILY
COMMUNITY
End: 2022-09-27 | Stop reason: SDUPTHER

## 2022-08-30 NOTE — PROGRESS NOTES
"Chief Complaint  Nausea (One month follow up )    Subjective         Dorie Head presents to [facility]  Presents to the office today for 1 month follow-up regarding her generalized fatigue and weakness.  She states that her symptoms seem to be getting worse.  She states that she has a difficult time even taking the trash out without becoming weak.  Does state that she is also been having some nausea.  She denies any vomiting.  Pressure on arrival today is 124/66.  She does complain of polyarthralgia.  She states that the aches and pains are mainly in her shoulders and her back.  I did explain to the patient that we would check labs to further evaluate the polyarthralgia.  She is also having labs completed for the cardiologist.  She does have an appointment with gastroenterology next month.  After reviewing her medication patient states that she has been on the carvedilol twice a day for approximately 7 months.  She does state that this is about how long her symptoms have been present.  I did explain to the patient that carvedilol can cause generalized fatigue and weakness.  I did ask her to hold the medicine for the next couple days to see if her symptoms start to subside.  Patient agrees to this is any other concerns       Objective     Vitals:    08/30/22 0904   BP: 124/66   BP Location: Right arm   Patient Position: Sitting   Cuff Size: Adult   Pulse: 83   Temp: 97.8 °F (36.6 °C)   TempSrc: Temporal   SpO2: 96%   Weight: 56.2 kg (124 lb)   Height: 175.3 cm (69.02\")      Body mass index is 18.3 kg/m².    BMI is below normal parameters (malnutrition). Recommendations: none (medical contraindication)        Physical Exam  Vitals reviewed.   Constitutional:       Appearance: Normal appearance.   Cardiovascular:      Rate and Rhythm: Normal rate and regular rhythm.      Pulses: Normal pulses.      Heart sounds: Normal heart sounds, S1 normal and S2 normal. No murmur " heard.  Pulmonary:      Effort: Pulmonary effort is normal. No respiratory distress.      Breath sounds: Normal breath sounds.   Skin:     General: Skin is warm and dry.   Neurological:      Mental Status: She is alert and oriented to person, place, and time.   Psychiatric:         Attention and Perception: Attention normal.         Mood and Affect: Mood normal.         Behavior: Behavior normal.          Result Review :   The following data was reviewed by: ANNE Suero on 08/30/2022:      Procedures    Assessment and Plan   Diagnoses and all orders for this visit:    1. Polyarthralgia (Primary)  -     Uric acid; Future  -     Antistreptolysin O screen; Future  -     Rheumatoid factor; Future  -     C-reactive protein; Future  -     GWENDOLYN; Future    2. Shortness of breath  -     XR Chest 2 View; Future    3. Fatigue, unspecified type  -     POC Glycosylated Hemoglobin (Hb A1C)  -     Hank-Barr Virus VCA, IgM; Future  -     CMV IgG IgM; Future  -     Cytomegalovirus Antibody, IgG; Future  -     EBV Antibody VCA, IgG; Future      She will hold her carvedilol for the next 2 to 3 days to see if her symptoms improve in regards to the fatigue and weakness.  She states that she will contact the office if there is an improvement.  Did tell her to continue to monitor her blood pressure closely when she is holding this medication and to let the office know if her blood pressure increases significantly including anything over 140 systolically    Follow Up   Return in about 1 month (around 9/30/2022) for Recheck.  Patient was given instructions and counseling regarding her condition or for health maintenance advice. Please see specific information pulled into the AVS if appropriate.       Answers for HPI/ROS submitted by the patient on 8/28/2022  Please describe your symptoms.: Nausea, shortness of breath, extreme pain in both shoulder joints.  Extreme weakness when trying to do anything and shortness of breath.  I don't  feel as though I have any quality of life.  I no longer am able to have any social interactions with anyone.  I can't even perform the most menial of tasks any longer.  Have a lot of difficulty sleeping.  The nausea seems to stay with me most of the day and I don't feel better when I lie down.  I only leave the house when I have doctor appointments and tests scheduled.  I feel worse now than when I first began to go to primary care for my symptoms.  I don't know if COPD has gotten worse since I have never received any type of treatment for it since I was first diagnosed.  I am having blood drawn on 3o Aug, per request of cardiologist.  Also wants to schedule a doppler on my carotid arteries but have had to postpone due to lack of funds for copayment.  I have exhausted my savings and checking accounts paying for test, Dr. castellano and bills.  Have you had these symptoms before?: Yes  How long have you been having these symptoms?: Greater than 2 weeks  Please list any medications you are currently taking for this condition.: None  Please describe any probable cause for these symptoms. : If I were aware of the cause I wouldn't need to seek medical treatment.  All I know is that since I had COVID I have been sick and getting sicker with the passage of time.  I need something that will make me feel better.  All I do is lie in bed or sit on the couch all day every day.  It is not normal to feel extremely ill all the time.  I don't have any kind of life any longer.  I have a lot of stress and anxiety from feeling so terrible every day.  7 months is a very long time to feel so ill with no resolution the problem.  I just need to feel better so that I can function on a meaningful level.  What is the primary reason for your visit?: Other

## 2022-08-31 DIAGNOSIS — R76.8 ELEVATED RHEUMATOID FACTOR: Primary | ICD-10-CM

## 2022-08-31 DIAGNOSIS — M25.50 POLYARTHRALGIA: ICD-10-CM

## 2022-08-31 LAB
ASO AB SERPL-ACNC: NEGATIVE [IU]/ML
CMV IGG SERPL IA-ACNC: 3.7 U/ML (ref 0–0.59)
CMV IGM SERPL IA-ACNC: <30 AU/ML (ref 0–29.9)
DSDNA IGG SERPL IA-ACNC: NEGATIVE [IU]/ML
EBV VCA IGG SER IA-ACNC: 478 U/ML (ref 0–17.9)
EBV VCA IGM SER IA-ACNC: <36 U/ML (ref 0–35.9)
NUCLEAR IGG SER IA-RTO: NEGATIVE

## 2022-09-12 ENCOUNTER — OFFICE VISIT (OUTPATIENT)
Dept: GASTROENTEROLOGY | Facility: CLINIC | Age: 72
End: 2022-09-12

## 2022-09-12 VITALS
SYSTOLIC BLOOD PRESSURE: 131 MMHG | BODY MASS INDEX: 18.07 KG/M2 | HEIGHT: 69 IN | DIASTOLIC BLOOD PRESSURE: 72 MMHG | WEIGHT: 122 LBS | HEART RATE: 79 BPM

## 2022-09-12 DIAGNOSIS — K52.831 COLLAGENOUS COLITIS: ICD-10-CM

## 2022-09-12 DIAGNOSIS — R11.0 NAUSEA: Primary | ICD-10-CM

## 2022-09-12 PROCEDURE — 99213 OFFICE O/P EST LOW 20 MIN: CPT | Performed by: NURSE PRACTITIONER

## 2022-09-12 NOTE — PROGRESS NOTES
Chief Complaint     Follow-up (colitis)    History of Present Illness     Dorie Head is a 71 y.o. female who presents to [facility] for follow-up of collagenous colitis, diarrhea and nausea.    She reports that diarrhea is much improved since completing the budesonide.  Will experience some alternating with diarrhea and constipation occasionally.      Reports that she contracted COVID in January.  She was hospitalized and has been unable to work since then.      Admits nausea and anorexia.  Eating one meal per day.       History      Past Medical History:   Diagnosis Date   • Basal cell carcinoma    • Cancer (HCC)    • Cancer (HCC)     squamos cell carcinoma   • COPD (chronic obstructive pulmonary disease) (HCC)    • Coronary artery disease    • Diverticulosis can't remember   • Heart murmur can't remember    mitral valve regurgitation   • High blood pressure    • High cholesterol    • HL (hearing loss) 2011    pretty bad   • Inflammatory bowel disease can't remember    microscopic colitis   • Scoliosis 1983    was diagnoised while I was serving in the [employer]   • Visual impairment     wear glasses     Past Surgical History:   Procedure Laterality Date   • COLONOSCOPY  2017   • HYSTERECTOMY     • MOLE REMOVAL     • OTHER SURGICAL HISTORY      fallopian tube dilation   • TUBAL ABDOMINAL LIGATION       Family History   Problem Relation Age of Onset   • Diabetes Mother    • Alcohol abuse Mother    • Ovarian cancer Sister    • Cancer Sister         ovarian cancer   • Alcohol abuse Father    • Heart disease Father    • Hyperlipidemia Father    • Alcohol abuse Brother    • Drug abuse Brother         Current Medications       Current Outpatient Medications:   •  amLODIPine (NORVASC) 10 MG tablet, Take 10 mg by mouth Daily., Disp: , Rfl:   •  aspirin 81 MG EC tablet, Take 81 mg by mouth Daily., Disp: , Rfl:   •  carvedilol (COREG) 12.5 MG tablet, Take 12.5 mg by mouth 2 (Two) Times a  "Day., Disp: , Rfl:   •  ezetimibe (ZETIA) 10 MG tablet, , Disp: , Rfl:   •  losartan (COZAAR) 50 MG tablet, Take 50 mg by mouth Daily., Disp: , Rfl:   •  pravastatin (PRAVACHOL) 40 MG tablet, Take 40 mg by mouth Daily., Disp: , Rfl:      Allergies     No Known Allergies    Social History       Social History     Social History Narrative   • Not on file         Objective       /72 (BP Location: Left arm, Patient Position: Sitting, Cuff Size: Small Adult)   Pulse 79   Ht 175.3 cm (69\")   Wt 55.3 kg (122 lb)   BMI 18.02 kg/m²       Physical Exam  Constitutional:       General: She is not in acute distress.     Appearance: Normal appearance. She is well-developed and normal weight.   HENT:      Head: Normocephalic and atraumatic.   Eyes:      Conjunctiva/sclera: Conjunctivae normal.      Pupils: Pupils are equal, round, and reactive to light.      Visual Fields: Right eye visual fields normal and left eye visual fields normal.   Cardiovascular:      Rate and Rhythm: Normal rate and regular rhythm.      Heart sounds: Normal heart sounds.   Pulmonary:      Effort: Pulmonary effort is normal. No retractions.      Breath sounds: Normal breath sounds and air entry.   Abdominal:      General: Bowel sounds are normal.      Palpations: Abdomen is soft.      Tenderness: There is no abdominal tenderness.      Comments: No appreciable hepatosplenomegaly or ascites   Musculoskeletal:         General: Normal range of motion.      Cervical back: Neck supple.      Right lower leg: No edema.      Left lower leg: No edema.   Lymphadenopathy:      Cervical: No cervical adenopathy.   Skin:     General: Skin is warm and dry.      Findings: No lesion.   Neurological:      General: No focal deficit present.      Mental Status: She is alert and oriented to person, place, and time.   Psychiatric:         Mood and Affect: Mood and affect normal.         Behavior: Behavior normal.         Results       Result Review :    The following " data was reviewed by: ANNE Mancuso on 09/12/2022:    CBC w/diff    CBC w/Diff 1/31/22 5/10/22   WBC 7.63 7.37   RBC 3.99 4.26   Hemoglobin 13.7 14.3   Hematocrit 37.9 42.6   MCV 95.0 100.0 (A)   MCH 34.3 (A) 33.6 (A)   MCHC 36.1 (A) 33.6   RDW 11.9 (A) 11.9 (A)   Platelets 276 261   Neutrophil Rel % 69.2    Immature Granulocyte Rel % 0.1    Lymphocyte Rel % 20.2    Monocyte Rel % 6.8    Eosinophil Rel % 3.0    Basophil Rel % 0.7    (A) Abnormal value            CMP    CMP 2/2/22 5/10/22 8/30/22   Glucose 88 80 104 (A)   BUN 18 9 15   Creatinine 0.88 0.96 0.89   eGFR Non African Am 64     Sodium 130 (A) 140 143   Potassium 4.3 4.6 4.5   Chloride 94 (A) 104 104   Calcium 9.5 10.3 9.9   Albumin  4.20 4.40   Total Bilirubin  0.3 0.3   Alkaline Phosphatase  72 77   AST (SGOT)  14 18   ALT (SGPT)  10 13   (A) Abnormal value            CT Abdomen Pelvis Without Contrast (07/11/2022 17:47)  1. Diverticulosis without evidence of diverticulitis  2. No acute abnormality on limited noncontrast imaging.               Assessment and Plan              Diagnoses and all orders for this visit:    1. Nausea (Primary)    2. Collagenous colitis      Discussed further work-up of nausea with patient including EGD.  She declines to schedule at this time due to financial concerns.  She will call the office when she is ready to schedule.    I spent 23 minutes caring for Dorie on this date of service. This time includes time spent by me in the following activities:reviewing tests, performing a medically appropriate examination and/or evaluation , counseling and educating the patient/family/caregiver, documenting information in the medical record and independently interpreting results and communicating that information with the patient/family/caregiver  Follow Up     Follow Up   Return in about 6 months (around 3/12/2023) for nausea.  Patient was given instructions and counseling regarding her condition or for health maintenance  advice. Please see specific information pulled into the AVS if appropriate.

## 2022-09-15 ENCOUNTER — CLINICAL SUPPORT (OUTPATIENT)
Dept: FAMILY MEDICINE CLINIC | Facility: CLINIC | Age: 72
End: 2022-09-15

## 2022-09-15 ENCOUNTER — TELEPHONE (OUTPATIENT)
Dept: FAMILY MEDICINE CLINIC | Facility: CLINIC | Age: 72
End: 2022-09-15

## 2022-09-15 DIAGNOSIS — Z79.899 MEDICATION MANAGEMENT: Primary | ICD-10-CM

## 2022-09-15 DIAGNOSIS — M25.50 POLYARTHRALGIA: Primary | ICD-10-CM

## 2022-09-15 PROCEDURE — 80305 DRUG TEST PRSMV DIR OPT OBS: CPT | Performed by: NURSE PRACTITIONER

## 2022-09-15 RX ORDER — TRAMADOL HYDROCHLORIDE 50 MG/1
50 TABLET ORAL EVERY 6 HOURS PRN
Qty: 30 TABLET | Refills: 0 | Status: SHIPPED | OUTPATIENT
Start: 2022-09-15 | End: 2022-09-27 | Stop reason: SDUPTHER

## 2022-09-15 NOTE — TELEPHONE ENCOUNTER
----- Message from Dorie Head sent at 9/15/2022  3:17 PM EDT -----  Regarding: results  Sorry to bother you yet again; I just heard from [employer] about my Leave of Absence, and they require yet once again for more forms to be filled out and faxed back to them, so I told them to just fax he forms to you.  If Nahun could just, please fill them out and fax them to [employer] I would really appreciate it.  The have to have them back by the 23rd of this month.  Thank you so much.

## 2022-09-27 ENCOUNTER — APPOINTMENT (OUTPATIENT)
Dept: CARDIOLOGY | Facility: HOSPITAL | Age: 72
End: 2022-09-27

## 2022-09-27 ENCOUNTER — OFFICE VISIT (OUTPATIENT)
Dept: FAMILY MEDICINE CLINIC | Facility: CLINIC | Age: 72
End: 2022-09-27

## 2022-09-27 VITALS
SYSTOLIC BLOOD PRESSURE: 136 MMHG | BODY MASS INDEX: 18.07 KG/M2 | OXYGEN SATURATION: 99 % | WEIGHT: 122 LBS | HEIGHT: 69 IN | HEART RATE: 60 BPM | DIASTOLIC BLOOD PRESSURE: 60 MMHG | TEMPERATURE: 95.9 F

## 2022-09-27 DIAGNOSIS — M25.50 POLYARTHRALGIA: ICD-10-CM

## 2022-09-27 DIAGNOSIS — G93.32 COVID-19 LONG HAULER MANIFESTING CHRONIC FATIGUE: ICD-10-CM

## 2022-09-27 DIAGNOSIS — R11.0 NAUSEA: Primary | ICD-10-CM

## 2022-09-27 DIAGNOSIS — U09.9 COVID-19 LONG HAULER MANIFESTING CHRONIC FATIGUE: ICD-10-CM

## 2022-09-27 PROCEDURE — 99214 OFFICE O/P EST MOD 30 MIN: CPT | Performed by: NURSE PRACTITIONER

## 2022-09-27 RX ORDER — CARVEDILOL 6.25 MG/1
6.25 TABLET ORAL 2 TIMES DAILY
COMMUNITY
Start: 2022-09-15 | End: 2023-02-21

## 2022-09-27 RX ORDER — TRAMADOL HYDROCHLORIDE 50 MG/1
50 TABLET ORAL EVERY 8 HOURS PRN
Qty: 60 TABLET | Refills: 0 | Status: SHIPPED | OUTPATIENT
Start: 2022-09-27 | End: 2023-02-21

## 2022-09-27 RX ORDER — AMLODIPINE BESYLATE 5 MG/1
5 TABLET ORAL DAILY
COMMUNITY
Start: 2022-09-15 | End: 2023-02-21

## 2022-09-27 RX ORDER — ONDANSETRON HYDROCHLORIDE 8 MG/1
8 TABLET, FILM COATED ORAL EVERY 12 HOURS PRN
Qty: 30 TABLET | Refills: 0 | Status: SHIPPED | OUTPATIENT
Start: 2022-09-27 | End: 2022-10-14 | Stop reason: SDUPTHER

## 2022-09-27 NOTE — PROGRESS NOTES
"Chief Complaint  Nausea (One month follow up )    Subjective         Dorie Head presents to [facility]  Patient presents to the office today for follow-up regarding her generalized polyarthralgia, nausea and her fatigue.  Patient states that she is having difficult time walking short distances without her legs becoming very weak.  She states that she is also struggling to lift her arms above her shoulders.  She states that this all started when she acquired COVID this past February and has yet to resolve.  Patient did have a positive toyed factor.  Patient was referred to rheumatology but cannot see them until next year.  Discussed with patient that her symptoms have been present for over 7 months and that her likelihood of returning to work would be slight.  Discussed with the patient that she should consider long-term disability.  Patient is struggling with this as she has a difficult time paying her bills on her fixed income.  She states that she needs to work.  She is very tearful and frustrated as she has not got better since having COVID in February 2022    Nausea  Associated symptoms include nausea.        Objective     Vitals:    09/27/22 0942   BP: 136/60   BP Location: Right arm   Patient Position: Sitting   Cuff Size: Adult   Pulse: 60   Temp: 95.9 °F (35.5 °C)   TempSrc: Temporal   SpO2: 99%   Weight: 55.3 kg (122 lb)   Height: 175.3 cm (69\")      Body mass index is 18.02 kg/m².    BMI is below normal parameters (malnutrition). Recommendations: none (medical contraindication)        Physical Exam  Vitals reviewed.   Constitutional:       Appearance: Normal appearance.   Cardiovascular:      Rate and Rhythm: Normal rate and regular rhythm.      Pulses: Normal pulses.      Heart sounds: Normal heart sounds, S1 normal and S2 normal. No murmur heard.  Pulmonary:      Effort: Pulmonary effort is normal. No respiratory distress.      Breath sounds: Normal breath sounds. "   Skin:     General: Skin is warm and dry.   Neurological:      Mental Status: She is alert and oriented to person, place, and time.   Psychiatric:         Attention and Perception: Attention normal.         Mood and Affect: Mood normal.         Behavior: Behavior normal.          Result Review :   The following data was reviewed by: ANNE Suero on 09/27/2022:      Procedures    Assessment and Plan   Diagnoses and all orders for this visit:    1. Nausea (Primary)  -     ondansetron (Zofran) 8 MG tablet; Take 1 tablet by mouth Every 12 (Twelve) Hours As Needed for Nausea or Vomiting.  Dispense: 30 tablet; Refill: 0    2. Polyarthralgia  -     traMADol (Ultram) 50 MG tablet; Take 1 tablet by mouth Every 8 (Eight) Hours As Needed for Severe Pain.  Dispense: 60 tablet; Refill: 0    3. COVID-19 long hauler manifesting chronic fatigue          Follow Up   No follow-ups on file.  Patient was given instructions and counseling regarding her condition or for health maintenance advice. Please see specific information pulled into the AVS if appropriate.       Answers for HPI/ROS submitted by the patient on 9/20/2022  Please describe your symptoms.: Severe shoulder pain, nausea.  Get nauseous if I stand for more than 15 minutes or whenever I move about. Short of breath with activity.  Feel extremely weak if I try to do anything.  Have you had these symptoms before?: Yes  How long have you been having these symptoms?: Greater than 2 weeks  What is the primary reason for your visit?: Other

## 2022-09-29 ENCOUNTER — TELEPHONE (OUTPATIENT)
Dept: FAMILY MEDICINE CLINIC | Facility: CLINIC | Age: 72
End: 2022-09-29

## 2022-09-29 NOTE — TELEPHONE ENCOUNTER
Caller: Dorie Head    Relationship to patient: Self    Best call back number: [phone]    Patient is needing: PER PATIENT, HER INSURANCE IS WANTING HER TO REQUEST A PRIOR AUTHORIZATION FOR HER TRAMADOL. PLEASE CALL PATIENT ONCE COMPLETED.

## 2022-10-04 ENCOUNTER — APPOINTMENT (OUTPATIENT)
Dept: CARDIOLOGY | Facility: HOSPITAL | Age: 72
End: 2022-10-04

## 2022-10-11 ENCOUNTER — TELEPHONE (OUTPATIENT)
Dept: FAMILY MEDICINE CLINIC | Facility: CLINIC | Age: 72
End: 2022-10-11

## 2022-10-11 NOTE — TELEPHONE ENCOUNTER
[facility]     Paperwork requested to be completed for [facility] is ready for , also available in chart.

## 2022-10-14 DIAGNOSIS — K59.03 CONSTIPATION DUE TO OPIOID THERAPY: Primary | ICD-10-CM

## 2022-10-14 DIAGNOSIS — T40.2X5A CONSTIPATION DUE TO OPIOID THERAPY: Primary | ICD-10-CM

## 2022-10-14 DIAGNOSIS — R11.0 NAUSEA: ICD-10-CM

## 2022-10-14 RX ORDER — ONDANSETRON HYDROCHLORIDE 8 MG/1
8 TABLET, FILM COATED ORAL EVERY 12 HOURS PRN
Qty: 30 TABLET | Refills: 0 | Status: SHIPPED | OUTPATIENT
Start: 2022-10-14 | End: 2022-11-05 | Stop reason: SDUPTHER

## 2022-10-14 RX ORDER — LUBIPROSTONE 24 UG/1
24 CAPSULE ORAL 2 TIMES DAILY WITH MEALS
Qty: 60 CAPSULE | Refills: 0 | Status: SHIPPED | OUTPATIENT
Start: 2022-10-14 | End: 2023-03-14

## 2022-11-05 DIAGNOSIS — R11.0 NAUSEA: ICD-10-CM

## 2022-11-07 RX ORDER — ONDANSETRON HYDROCHLORIDE 8 MG/1
8 TABLET, FILM COATED ORAL EVERY 12 HOURS PRN
Qty: 30 TABLET | Refills: 2 | Status: SHIPPED | OUTPATIENT
Start: 2022-11-07 | End: 2023-02-03 | Stop reason: SDUPTHER

## 2022-11-15 ENCOUNTER — OFFICE VISIT (OUTPATIENT)
Dept: FAMILY MEDICINE CLINIC | Facility: CLINIC | Age: 72
End: 2022-11-15

## 2022-11-15 VITALS
WEIGHT: 119.8 LBS | HEART RATE: 72 BPM | SYSTOLIC BLOOD PRESSURE: 130 MMHG | TEMPERATURE: 96.9 F | HEIGHT: 69 IN | BODY MASS INDEX: 17.74 KG/M2 | OXYGEN SATURATION: 97 % | DIASTOLIC BLOOD PRESSURE: 74 MMHG

## 2022-11-15 DIAGNOSIS — K59.00 CONSTIPATION, UNSPECIFIED CONSTIPATION TYPE: ICD-10-CM

## 2022-11-15 DIAGNOSIS — M25.50 POLYARTHRALGIA: ICD-10-CM

## 2022-11-15 DIAGNOSIS — Z12.31 ENCOUNTER FOR SCREENING MAMMOGRAM FOR MALIGNANT NEOPLASM OF BREAST: Primary | ICD-10-CM

## 2022-11-15 DIAGNOSIS — R11.0 NAUSEA: ICD-10-CM

## 2022-11-15 DIAGNOSIS — Z78.0 POSTMENOPAUSAL: ICD-10-CM

## 2022-11-15 DIAGNOSIS — R53.83 FATIGUE, UNSPECIFIED TYPE: ICD-10-CM

## 2022-11-15 PROCEDURE — 99213 OFFICE O/P EST LOW 20 MIN: CPT | Performed by: NURSE PRACTITIONER

## 2022-11-15 NOTE — PROGRESS NOTES
"Chief Complaint  Nausea (One month follow up)    Subjective         Dorie Head presents to [facility]  Patient presents to the office today for follow-up.  Patient states that her pain has improved somewhat with the tramadol.  He does have an appointment with rheumatology but this is not until June 2023.  She states that the pain medicine does cause significant constipation.  I did discuss incorporating MiraLAX over-the-counter daily to help reduce the constipation.  She states that she will try this.  Patient does state that she does continue to have the generalized fatigue and the occasional nausea.  Has a difficult time standing for extended periods  Discussed with the patient that we would follow back up with her in 3 months.  My concern is that her symptoms have not improved over the past 9 months.  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  I explained that she may need to find something that she is able to sit while working.       Objective     Vitals:    11/15/22 0813   BP: 130/74   BP Location: Right arm   Patient Position: Sitting   Cuff Size: Adult   Pulse: 72   Temp: 96.9 °F (36.1 °C)   TempSrc: Temporal   SpO2: 97%   Weight: 54.3 kg (119 lb 12.8 oz)   Height: 175.3 cm (69\")      Body mass index is 17.69 kg/m².    BMI is below normal parameters (malnutrition). Recommendations: none (medical contraindication)        Physical Exam  Vitals reviewed.   Constitutional:       Appearance: Normal appearance.   Cardiovascular:      Rate and Rhythm: Normal rate and regular rhythm.      Pulses: Normal pulses.      Heart sounds: Normal heart sounds, S1 normal and S2 normal. No murmur heard.  Pulmonary:      Effort: Pulmonary effort is normal. No respiratory distress.      Breath sounds: Normal breath sounds.   Skin:     General: Skin is warm and dry.   Neurological:      Mental Status: She is alert and oriented to person, place, " and time.   Psychiatric:         Attention and Perception: Attention normal.         Mood and Affect: Mood normal.         Behavior: Behavior normal.          Result Review :   The following data was reviewed by: ANNE Suero on 11/15/2022:      Procedures    Assessment and Plan   Diagnoses and all orders for this visit:    1. Encounter for screening mammogram for malignant neoplasm of breast (Primary)  -     Mammo Screening Digital Tomosynthesis Bilateral With CAD; Future    2. Postmenopausal  -     DEXA Bone Density Axial    3. Polyarthralgia  Comments:  Continue the tramadol 50 mg as needed    4. Fatigue, unspecified type    5. Nausea    6. Constipation, unspecified constipation type  Comments:  Start MiraLAX 17 g daily.    Patient's disability paperwork was completed today and faxed.  There has been little improvement in her condition.  I do think the patient needs to follow-up with rheumatology due to her lab work as well as her overall polyarthralgia.      Follow Up   Return in about 3 months (around 2/15/2023) for Recheck.  Patient was given instructions and counseling regarding her condition or for health maintenance advice. Please see specific information pulled into the AVS if appropriate.       Answers for HPI/ROS submitted by the patient on 11/14/2022  Please describe your symptoms.: Extreme nausea upon waking.  Shortness of breath with any type of activity, and an overall weak feeling whenever I try to do anything.  I feel bad every day. Have a lot of pain in my shoulders that spreads into my bicep, have to be extremely careful how I move my arms, have difficulty lifting anything, the mobilty in my arms is limiting.  Have you had these symptoms before?: Yes  How long have you been having these symptoms?: Greater than 2 weeks  Please list any medications you are currently taking for this condition.: Ondansetron for nausea and tramadol for pain.  Please describe any probable cause for these symptoms.  : Ever since I had covid seems to have exacerbated everything that I already had going on.  Where the RA came from, I have no clue, there is no family history of arthritis. When I was in the hospital, they told me the pain was muscular skeletal most likely brought on by covid, no mention of arthritis. I have a difficult time with having felt so terrible for nearly a year now, I have NO quality of life anymore.  I can barely leave my house anymore, I have to force myself just to keep appointments.  What is the primary reason for your visit?: Other

## 2022-12-30 ENCOUNTER — TELEPHONE (OUTPATIENT)
Dept: FAMILY MEDICINE CLINIC | Facility: CLINIC | Age: 72
End: 2022-12-30

## 2023-01-19 ENCOUNTER — TELEPHONE (OUTPATIENT)
Dept: FAMILY MEDICINE CLINIC | Facility: CLINIC | Age: 73
End: 2023-01-19
Payer: COMMERCIAL

## 2023-01-19 NOTE — TELEPHONE ENCOUNTER
Spoke with patient , advised records were sent to [facility]. If patient has suspicions that they were sent to herself and no [place] she will need to contact [facility]. Patient voiced understanding.

## 2023-01-19 NOTE — TELEPHONE ENCOUNTER
----- Message from Dorie Head sent at 1/19/2023  7:00 AM EST -----  Regarding: Just a question  Contact: [phone]  I don't want the records for myself, Eliza wants them.   I signed a release of information form that Eliza sent and provided it to you, then you sent me an additional release form witch I filled out and signed and returned to you, so far nothing has happened. I am really getting confused what else do I need to do?

## 2023-02-03 ENCOUNTER — TELEPHONE (OUTPATIENT)
Dept: FAMILY MEDICINE CLINIC | Facility: CLINIC | Age: 73
End: 2023-02-03

## 2023-02-03 DIAGNOSIS — R11.0 NAUSEA: ICD-10-CM

## 2023-02-03 RX ORDER — ONDANSETRON HYDROCHLORIDE 8 MG/1
8 TABLET, FILM COATED ORAL EVERY 12 HOURS PRN
Qty: 30 TABLET | Refills: 2 | Status: SHIPPED | OUTPATIENT
Start: 2023-02-03

## 2023-02-03 RX ORDER — ONDANSETRON HYDROCHLORIDE 8 MG/1
TABLET, FILM COATED ORAL
Qty: 30 TABLET | Refills: 0 | OUTPATIENT
Start: 2023-02-03

## 2023-02-03 NOTE — TELEPHONE ENCOUNTER
Caller: [facility]    Relationship: Other    Best call back number: [phone]    What is the best time to reach you: 9AM TO 5PM    Who are you requesting to speak with CLINICAL     What was the call regarding: ALEESHIA FROM [employer] IS REQUESTING A CALL BACK REGARDING MEDICAL RELEASE TO GO BACK TO WORK    PLEASE ADVISE     Do you require a callback: YES

## 2023-02-21 ENCOUNTER — OFFICE VISIT (OUTPATIENT)
Dept: FAMILY MEDICINE CLINIC | Facility: CLINIC | Age: 73
End: 2023-02-21
Payer: COMMERCIAL

## 2023-02-21 VITALS
DIASTOLIC BLOOD PRESSURE: 90 MMHG | HEIGHT: 69 IN | HEART RATE: 78 BPM | SYSTOLIC BLOOD PRESSURE: 144 MMHG | WEIGHT: 119 LBS | OXYGEN SATURATION: 97 % | TEMPERATURE: 96.9 F | BODY MASS INDEX: 17.63 KG/M2

## 2023-02-21 DIAGNOSIS — I10 PRIMARY HYPERTENSION: Primary | ICD-10-CM

## 2023-02-21 DIAGNOSIS — E78.5 HYPERLIPIDEMIA, UNSPECIFIED HYPERLIPIDEMIA TYPE: ICD-10-CM

## 2023-02-21 PROCEDURE — 99213 OFFICE O/P EST LOW 20 MIN: CPT | Performed by: NURSE PRACTITIONER

## 2023-02-21 RX ORDER — AMLODIPINE BESYLATE 10 MG/1
TABLET ORAL EVERY 24 HOURS
COMMUNITY
End: 2023-03-14 | Stop reason: SDUPTHER

## 2023-02-21 RX ORDER — CARVEDILOL 12.5 MG/1
TABLET ORAL
COMMUNITY
Start: 2021-03-29 | End: 2023-03-14 | Stop reason: SDUPTHER

## 2023-02-21 RX ORDER — AMLODIPINE BESYLATE AND ATORVASTATIN CALCIUM 10; 10 MG/1; MG/1
TABLET, FILM COATED ORAL
COMMUNITY
Start: 2022-01-31 | End: 2023-02-21

## 2023-02-21 RX ORDER — ATORVASTATIN CALCIUM 10 MG/1
10 TABLET, FILM COATED ORAL DAILY
COMMUNITY
End: 2023-03-14

## 2023-02-21 NOTE — PROGRESS NOTES
"Chief Complaint  Hypertension (Patient would like to discuss you taking over her high blood pressure and cholesterol treatment)    Subjective         Dorie Head presents to [facility]  She presents to the office today to discuss going back to work.  He states that she is ready to start working again although she will require accommodations.  I did explain that I would provide a letter with these accommodations.  Patient also states that she would like her PCP to take over her blood pressure and cholesterol medications.  She denies any other concerns at this time.       Objective     Vitals:    02/21/23 0809   BP: 144/90   BP Location: Right arm   Patient Position: Sitting   Cuff Size: Adult   Pulse: 78   Temp: 96.9 °F (36.1 °C)   TempSrc: Temporal   SpO2: 97%   Weight: 54 kg (119 lb)   Height: 175.3 cm (69\")      Body mass index is 17.57 kg/m².    BMI is below normal parameters (malnutrition). Recommendations: none (medical contraindication)        Physical Exam  Vitals reviewed.   Constitutional:       Appearance: Normal appearance.   Cardiovascular:      Rate and Rhythm: Normal rate and regular rhythm.      Pulses: Normal pulses.      Heart sounds: Normal heart sounds, S1 normal and S2 normal. No murmur heard.  Pulmonary:      Effort: Pulmonary effort is normal. No respiratory distress.      Breath sounds: Normal breath sounds.   Skin:     General: Skin is warm and dry.   Neurological:      Mental Status: She is alert and oriented to person, place, and time.   Psychiatric:         Attention and Perception: Attention normal.         Mood and Affect: Mood normal.         Behavior: Behavior normal.          Result Review :   The following data was reviewed by: ANNE Suero on 02/21/2023:      Procedures    Assessment and Plan   Diagnoses and all orders for this visit:    1. Primary hypertension (Primary)  Comments:  Patient is stable.  We will continue amlodipine 10 mg, " carvedilol 12.5 mg    2. Hyperlipidemia, unspecified hyperlipidemia type  Comments:  Patient is stable.  We will continue her pravastatin 40 mg nightly as well as her Zetia 10 mg          Follow Up   No follow-ups on file.  Patient was given instructions and counseling regarding her condition or for health maintenance advice. Please see specific information pulled into the AVS if appropriate.

## 2023-03-14 ENCOUNTER — OFFICE VISIT (OUTPATIENT)
Dept: FAMILY MEDICINE CLINIC | Facility: CLINIC | Age: 73
End: 2023-03-14
Payer: COMMERCIAL

## 2023-03-14 VITALS
WEIGHT: 117 LBS | OXYGEN SATURATION: 98 % | DIASTOLIC BLOOD PRESSURE: 64 MMHG | HEART RATE: 67 BPM | BODY MASS INDEX: 17.33 KG/M2 | TEMPERATURE: 97 F | HEIGHT: 69 IN | SYSTOLIC BLOOD PRESSURE: 132 MMHG

## 2023-03-14 DIAGNOSIS — I10 PRIMARY HYPERTENSION: Primary | ICD-10-CM

## 2023-03-14 DIAGNOSIS — E78.5 HYPERLIPIDEMIA, UNSPECIFIED HYPERLIPIDEMIA TYPE: ICD-10-CM

## 2023-03-14 PROCEDURE — 99214 OFFICE O/P EST MOD 30 MIN: CPT | Performed by: NURSE PRACTITIONER

## 2023-03-14 RX ORDER — LOSARTAN POTASSIUM 50 MG/1
50 TABLET ORAL DAILY
Qty: 90 TABLET | Refills: 3 | Status: SHIPPED | OUTPATIENT
Start: 2023-03-14

## 2023-03-14 RX ORDER — PRAVASTATIN SODIUM 40 MG
40 TABLET ORAL DAILY
Qty: 90 TABLET | Refills: 3 | Status: SHIPPED | OUTPATIENT
Start: 2023-03-14

## 2023-03-14 RX ORDER — AMLODIPINE BESYLATE 10 MG/1
10 TABLET ORAL DAILY
Qty: 90 TABLET | Refills: 3 | Status: SHIPPED | OUTPATIENT
Start: 2023-03-14

## 2023-03-14 RX ORDER — CARVEDILOL 12.5 MG/1
12.5 TABLET ORAL 2 TIMES DAILY WITH MEALS
Qty: 90 TABLET | Refills: 1 | Status: SHIPPED | OUTPATIENT
Start: 2023-03-14

## 2023-03-14 RX ORDER — ASPIRIN 81 MG/1
81 TABLET ORAL DAILY
Qty: 90 TABLET | Refills: 1 | Status: SHIPPED | OUTPATIENT
Start: 2023-03-14

## 2023-03-14 RX ORDER — EZETIMIBE 10 MG/1
10 TABLET ORAL DAILY
Qty: 90 TABLET | Refills: 3 | Status: SHIPPED | OUTPATIENT
Start: 2023-03-14

## 2023-03-14 NOTE — PROGRESS NOTES
"Chief Complaint  Hyperlipidemia and Hypertension    Subjective         Dorie Head presents to [facility]  Patient presents to the office today for refills of her medications.  She states that she is needing refills on everything.  She also states that she is ready to return to work without any restrictions.  She states that she would like to return on April 18.  He states that she is needing a letter stating this.  She denies any other concerns or complaints at this time    Hyperlipidemia    Hypertension         Objective     Vitals:    03/14/23 1010   BP: 132/64   BP Location: Right arm   Patient Position: Sitting   Cuff Size: Adult   Pulse: 67   Temp: 97 °F (36.1 °C)   TempSrc: Temporal   SpO2: 98%   Weight: 53.1 kg (117 lb)   Height: 175.3 cm (69\")      Body mass index is 17.28 kg/m².    BMI is below normal parameters (malnutrition). Recommendations: none (medical contraindication)        Physical Exam  Vitals reviewed.   Constitutional:       Appearance: Normal appearance.   Cardiovascular:      Rate and Rhythm: Normal rate and regular rhythm.      Pulses: Normal pulses.      Heart sounds: Normal heart sounds, S1 normal and S2 normal. No murmur heard.  Pulmonary:      Effort: Pulmonary effort is normal. No respiratory distress.      Breath sounds: Normal breath sounds.   Skin:     General: Skin is warm and dry.   Neurological:      Mental Status: She is alert and oriented to person, place, and time.   Psychiatric:         Attention and Perception: Attention normal.         Mood and Affect: Mood normal.         Behavior: Behavior normal.          Result Review :   The following data was reviewed by: ANNE Suero on 03/14/2023:    Procedures    Assessment and Plan   Diagnoses and all orders for this visit:    1. Primary hypertension (Primary)  -     losartan (COZAAR) 50 MG tablet; Take 1 tablet by mouth Daily.  Dispense: 90 tablet; Refill: 3  -     amLODIPine (NORVASC) 10 MG " tablet; Take 1 tablet by mouth Daily.  Dispense: 90 tablet; Refill: 3    2. Hyperlipidemia, unspecified hyperlipidemia type  -     pravastatin (PRAVACHOL) 40 MG tablet; Take 1 tablet by mouth Daily.  Dispense: 90 tablet; Refill: 3  -     ezetimibe (ZETIA) 10 MG tablet; Take 1 tablet by mouth Daily.  Dispense: 90 tablet; Refill: 3      Letter provided for the patient releasing her from her work restrictions as of April 18, 2023    Follow Up   Return in about 6 months (around 9/14/2023).  Patient was given instructions and counseling regarding her condition or for health maintenance advice. Please see specific information pulled into the AVS if appropriate.

## 2023-05-05 DIAGNOSIS — R11.0 NAUSEA: ICD-10-CM

## 2023-05-05 RX ORDER — ONDANSETRON HYDROCHLORIDE 8 MG/1
TABLET, FILM COATED ORAL
Qty: 30 TABLET | Refills: 0 | Status: SHIPPED | OUTPATIENT
Start: 2023-05-05

## 2023-06-11 DIAGNOSIS — R11.0 NAUSEA: ICD-10-CM

## 2023-06-11 DIAGNOSIS — I10 PRIMARY HYPERTENSION: ICD-10-CM

## 2023-06-12 RX ORDER — LOSARTAN POTASSIUM 50 MG/1
50 TABLET ORAL DAILY
Qty: 90 TABLET | Refills: 3 | Status: SHIPPED | OUTPATIENT
Start: 2023-06-12

## 2023-06-12 RX ORDER — ONDANSETRON HYDROCHLORIDE 8 MG/1
8 TABLET, FILM COATED ORAL EVERY 8 HOURS PRN
Qty: 30 TABLET | Refills: 1 | Status: SHIPPED | OUTPATIENT
Start: 2023-06-12

## 2023-08-29 DIAGNOSIS — R11.0 NAUSEA: ICD-10-CM

## 2023-08-29 RX ORDER — ONDANSETRON HYDROCHLORIDE 8 MG/1
TABLET, FILM COATED ORAL
Qty: 30 TABLET | Refills: 0 | Status: SHIPPED | OUTPATIENT
Start: 2023-08-29

## 2023-10-03 ENCOUNTER — TELEPHONE (OUTPATIENT)
Dept: FAMILY MEDICINE CLINIC | Facility: CLINIC | Age: 73
End: 2023-10-03
Payer: COMMERCIAL

## 2023-10-03 NOTE — TELEPHONE ENCOUNTER
Received phone call from [facility] rheumatology, , the patient has no showed 3 appointments and they will not see the patient now for the no shows.

## 2023-10-06 DIAGNOSIS — R11.0 NAUSEA: ICD-10-CM

## 2023-10-06 RX ORDER — ONDANSETRON HYDROCHLORIDE 8 MG/1
8 TABLET, FILM COATED ORAL EVERY 8 HOURS PRN
Qty: 30 TABLET | Refills: 3 | Status: SHIPPED | OUTPATIENT
Start: 2023-10-06

## 2023-11-29 ENCOUNTER — HOSPITAL ENCOUNTER (OUTPATIENT)
Facility: HOSPITAL | Age: 73
Setting detail: OBSERVATION
LOS: 1 days | Discharge: HOME | End: 2023-11-30
Attending: EMERGENCY MEDICINE | Admitting: INTERNAL MEDICINE
Payer: COMMERCIAL

## 2023-11-29 ENCOUNTER — APPOINTMENT (OUTPATIENT)
Dept: CT IMAGING | Facility: HOSPITAL | Age: 73
End: 2023-11-29
Payer: COMMERCIAL

## 2023-11-29 ENCOUNTER — APPOINTMENT (OUTPATIENT)
Dept: GENERAL RADIOLOGY | Facility: HOSPITAL | Age: 73
End: 2023-11-29
Payer: COMMERCIAL

## 2023-11-29 DIAGNOSIS — R09.02 HYPOXIA: ICD-10-CM

## 2023-11-29 DIAGNOSIS — U07.1 COVID-19: Primary | ICD-10-CM

## 2023-11-29 DIAGNOSIS — J44.1 COPD EXACERBATION: ICD-10-CM

## 2023-11-29 LAB
ALBUMIN SERPL-MCNC: 3.5 G/DL (ref 3.5–5.2)
ALBUMIN/GLOB SERPL: 0.8 G/DL
ALP SERPL-CCNC: 81 U/L (ref 39–117)
ALT SERPL W P-5'-P-CCNC: 5 U/L (ref 1–33)
ANION GAP SERPL CALCULATED.3IONS-SCNC: 12.9 MMOL/L (ref 5–15)
AST SERPL-CCNC: 11 U/L (ref 1–32)
BACTERIA UR QL AUTO: ABNORMAL /HPF
BASOPHILS # BLD AUTO: 0.04 10*3/MM3 (ref 0–0.2)
BASOPHILS NFR BLD AUTO: 0.4 % (ref 0–1.5)
BILIRUB SERPL-MCNC: 0.7 MG/DL (ref 0–1.2)
BILIRUB UR QL STRIP: ABNORMAL
BUN SERPL-MCNC: 21 MG/DL (ref 8–23)
BUN/CREAT SERPL: 17.6 (ref 7–25)
CALCIUM SPEC-SCNC: 9.1 MG/DL (ref 8.6–10.5)
CHLORIDE SERPL-SCNC: 95 MMOL/L (ref 98–107)
CLARITY UR: ABNORMAL
CO2 SERPL-SCNC: 27.1 MMOL/L (ref 22–29)
COLOR UR: ABNORMAL
CREAT SERPL-MCNC: 1.19 MG/DL (ref 0.57–1)
D-LACTATE SERPL-SCNC: 1.3 MMOL/L (ref 0.5–2)
DEPRECATED RDW RBC AUTO: 41.9 FL (ref 37–54)
EGFRCR SERPLBLD CKD-EPI 2021: 49 ML/MIN/1.73
EOSINOPHIL # BLD AUTO: 0.02 10*3/MM3 (ref 0–0.4)
EOSINOPHIL NFR BLD AUTO: 0.2 % (ref 0.3–6.2)
ERYTHROCYTE [DISTWIDTH] IN BLOOD BY AUTOMATED COUNT: 11.9 % (ref 12.3–15.4)
FLUAV SUBTYP SPEC NAA+PROBE: NOT DETECTED
FLUBV RNA ISLT QL NAA+PROBE: NOT DETECTED
GLOBULIN UR ELPH-MCNC: 4.4 GM/DL
GLUCOSE SERPL-MCNC: 143 MG/DL (ref 65–99)
GLUCOSE UR STRIP-MCNC: NEGATIVE MG/DL
GRAN CASTS URNS QL MICRO: ABNORMAL /LPF
HCT VFR BLD AUTO: 38.4 % (ref 34–46.6)
HGB BLD-MCNC: 13 G/DL (ref 12–15.9)
HGB UR QL STRIP.AUTO: ABNORMAL
HOLD SPECIMEN: NORMAL
HOLD SPECIMEN: NORMAL
HYALINE CASTS UR QL AUTO: ABNORMAL /LPF
IMM GRANULOCYTES # BLD AUTO: 0.04 10*3/MM3 (ref 0–0.05)
IMM GRANULOCYTES NFR BLD AUTO: 0.4 % (ref 0–0.5)
KETONES UR QL STRIP: NEGATIVE
L PNEUMO1 AG UR QL IA: NEGATIVE
LEUKOCYTE ESTERASE UR QL STRIP.AUTO: ABNORMAL
LIPASE SERPL-CCNC: 26 U/L (ref 13–60)
LYMPHOCYTES # BLD AUTO: 1.06 10*3/MM3 (ref 0.7–3.1)
LYMPHOCYTES NFR BLD AUTO: 11.2 % (ref 19.6–45.3)
MAGNESIUM SERPL-MCNC: 1.9 MG/DL (ref 1.6–2.4)
MCH RBC QN AUTO: 32.2 PG (ref 26.6–33)
MCHC RBC AUTO-ENTMCNC: 33.9 G/DL (ref 31.5–35.7)
MCV RBC AUTO: 95 FL (ref 79–97)
MONOCYTES # BLD AUTO: 0.9 10*3/MM3 (ref 0.1–0.9)
MONOCYTES NFR BLD AUTO: 9.5 % (ref 5–12)
MUCOUS THREADS URNS QL MICRO: ABNORMAL /HPF
NEUTROPHILS NFR BLD AUTO: 7.39 10*3/MM3 (ref 1.7–7)
NEUTROPHILS NFR BLD AUTO: 78.3 % (ref 42.7–76)
NITRITE UR QL STRIP: NEGATIVE
NRBC BLD AUTO-RTO: 0 /100 WBC (ref 0–0.2)
NT-PROBNP SERPL-MCNC: 152.4 PG/ML (ref 0–900)
PH UR STRIP.AUTO: 5.5 [PH] (ref 5–8)
PLATELET # BLD AUTO: 251 10*3/MM3 (ref 140–450)
PMV BLD AUTO: 10.5 FL (ref 6–12)
POTASSIUM SERPL-SCNC: 3.2 MMOL/L (ref 3.5–5.2)
PROCALCITONIN SERPL-MCNC: 0.16 NG/ML (ref 0–0.25)
PROT SERPL-MCNC: 7.9 G/DL (ref 6–8.5)
PROT UR QL STRIP: ABNORMAL
QT INTERVAL: 405 MS
QTC INTERVAL: 435 MS
RBC # BLD AUTO: 4.04 10*6/MM3 (ref 3.77–5.28)
RBC # UR STRIP: ABNORMAL /HPF
REF LAB TEST METHOD: ABNORMAL
RSV RNA NPH QL NAA+NON-PROBE: NOT DETECTED
S PNEUM AG SPEC QL LA: POSITIVE
SARS-COV-2 RNA RESP QL NAA+PROBE: DETECTED
SODIUM SERPL-SCNC: 135 MMOL/L (ref 136–145)
SP GR UR STRIP: 1.02 (ref 1–1.03)
SQUAMOUS #/AREA URNS HPF: ABNORMAL /HPF
TROPONIN T SERPL HS-MCNC: 9 NG/L
UROBILINOGEN UR QL STRIP: ABNORMAL
WAXY CASTS #/AREA URNS LPF: ABNORMAL /LPF
WBC # UR STRIP: ABNORMAL /HPF
WBC CASTS #/AREA URNS LPF: ABNORMAL /LPF
WBC NRBC COR # BLD AUTO: 9.45 10*3/MM3 (ref 3.4–10.8)
WHOLE BLOOD HOLD COAG: NORMAL
WHOLE BLOOD HOLD SPECIMEN: NORMAL

## 2023-11-29 PROCEDURE — G0378 HOSPITAL OBSERVATION PER HR: HCPCS

## 2023-11-29 PROCEDURE — 87086 URINE CULTURE/COLONY COUNT: CPT | Performed by: PHYSICIAN ASSISTANT

## 2023-11-29 PROCEDURE — 87449 NOS EACH ORGANISM AG IA: CPT | Performed by: INTERNAL MEDICINE

## 2023-11-29 PROCEDURE — 93005 ELECTROCARDIOGRAM TRACING: CPT

## 2023-11-29 PROCEDURE — 83735 ASSAY OF MAGNESIUM: CPT

## 2023-11-29 PROCEDURE — 25810000003 SODIUM CHLORIDE 0.9 % SOLUTION: Performed by: PHYSICIAN ASSISTANT

## 2023-11-29 PROCEDURE — 83690 ASSAY OF LIPASE: CPT | Performed by: PHYSICIAN ASSISTANT

## 2023-11-29 PROCEDURE — 99284 EMERGENCY DEPT VISIT MOD MDM: CPT

## 2023-11-29 PROCEDURE — 71250 CT THORAX DX C-: CPT

## 2023-11-29 PROCEDURE — 93005 ELECTROCARDIOGRAM TRACING: CPT | Performed by: EMERGENCY MEDICINE

## 2023-11-29 PROCEDURE — 96375 TX/PRO/DX INJ NEW DRUG ADDON: CPT

## 2023-11-29 PROCEDURE — 83880 ASSAY OF NATRIURETIC PEPTIDE: CPT | Performed by: PHYSICIAN ASSISTANT

## 2023-11-29 PROCEDURE — 94640 AIRWAY INHALATION TREATMENT: CPT

## 2023-11-29 PROCEDURE — 99205 OFFICE O/P NEW HI 60 MIN: CPT | Performed by: INTERNAL MEDICINE

## 2023-11-29 PROCEDURE — 87899 AGENT NOS ASSAY W/OPTIC: CPT | Performed by: INTERNAL MEDICINE

## 2023-11-29 PROCEDURE — 96372 THER/PROPH/DIAG INJ SC/IM: CPT

## 2023-11-29 PROCEDURE — 85025 COMPLETE CBC W/AUTO DIFF WBC: CPT

## 2023-11-29 PROCEDURE — 83605 ASSAY OF LACTIC ACID: CPT | Performed by: FAMILY MEDICINE

## 2023-11-29 PROCEDURE — 80053 COMPREHEN METABOLIC PANEL: CPT

## 2023-11-29 PROCEDURE — 25010000002 METHYLPREDNISOLONE PER 125 MG: Performed by: PHYSICIAN ASSISTANT

## 2023-11-29 PROCEDURE — 87040 BLOOD CULTURE FOR BACTERIA: CPT | Performed by: FAMILY MEDICINE

## 2023-11-29 PROCEDURE — 94761 N-INVAS EAR/PLS OXIMETRY MLT: CPT

## 2023-11-29 PROCEDURE — 84484 ASSAY OF TROPONIN QUANT: CPT

## 2023-11-29 PROCEDURE — 81001 URINALYSIS AUTO W/SCOPE: CPT | Performed by: EMERGENCY MEDICINE

## 2023-11-29 PROCEDURE — 63710000001 DEXAMETHASONE PER 0.25 MG: Performed by: FAMILY MEDICINE

## 2023-11-29 PROCEDURE — 84145 PROCALCITONIN (PCT): CPT | Performed by: INTERNAL MEDICINE

## 2023-11-29 PROCEDURE — 87637 SARSCOV2&INF A&B&RSV AMP PRB: CPT | Performed by: PHYSICIAN ASSISTANT

## 2023-11-29 PROCEDURE — 71045 X-RAY EXAM CHEST 1 VIEW: CPT

## 2023-11-29 PROCEDURE — 99222 1ST HOSP IP/OBS MODERATE 55: CPT | Performed by: FAMILY MEDICINE

## 2023-11-29 PROCEDURE — 25010000002 ONDANSETRON PER 1 MG: Performed by: FAMILY MEDICINE

## 2023-11-29 PROCEDURE — 96365 THER/PROPH/DIAG IV INF INIT: CPT

## 2023-11-29 PROCEDURE — 93010 ELECTROCARDIOGRAM REPORT: CPT | Performed by: SPECIALIST

## 2023-11-29 PROCEDURE — 96366 THER/PROPH/DIAG IV INF ADDON: CPT

## 2023-11-29 PROCEDURE — 25010000002 HEPARIN (PORCINE) PER 1000 UNITS: Performed by: FAMILY MEDICINE

## 2023-11-29 PROCEDURE — 25010000002 PIPERACILLIN SOD-TAZOBACTAM PER 1 G: Performed by: FAMILY MEDICINE

## 2023-11-29 PROCEDURE — 94799 UNLISTED PULMONARY SVC/PX: CPT

## 2023-11-29 RX ORDER — HEPARIN SODIUM 5000 [USP'U]/ML
5000 INJECTION, SOLUTION INTRAVENOUS; SUBCUTANEOUS EVERY 8 HOURS SCHEDULED
Status: DISCONTINUED | OUTPATIENT
Start: 2023-11-29 | End: 2023-11-30

## 2023-11-29 RX ORDER — SODIUM CHLORIDE 0.9 % (FLUSH) 0.9 %
10 SYRINGE (ML) INJECTION AS NEEDED
Status: DISCONTINUED | OUTPATIENT
Start: 2023-11-29 | End: 2023-11-30 | Stop reason: HOSPADM

## 2023-11-29 RX ORDER — SODIUM CHLORIDE 9 MG/ML
40 INJECTION, SOLUTION INTRAVENOUS AS NEEDED
Status: DISCONTINUED | OUTPATIENT
Start: 2023-11-29 | End: 2023-11-30 | Stop reason: HOSPADM

## 2023-11-29 RX ORDER — GUAIFENESIN/DEXTROMETHORPHAN 100-10MG/5
10 SYRUP ORAL EVERY 6 HOURS PRN
Status: DISCONTINUED | OUTPATIENT
Start: 2023-11-29 | End: 2023-11-30 | Stop reason: HOSPADM

## 2023-11-29 RX ORDER — AMOXICILLIN 250 MG
2 CAPSULE ORAL 2 TIMES DAILY
Status: DISCONTINUED | OUTPATIENT
Start: 2023-11-29 | End: 2023-11-30 | Stop reason: HOSPADM

## 2023-11-29 RX ORDER — IPRATROPIUM BROMIDE AND ALBUTEROL SULFATE 2.5; .5 MG/3ML; MG/3ML
3 SOLUTION RESPIRATORY (INHALATION) EVERY 4 HOURS PRN
Status: DISCONTINUED | OUTPATIENT
Start: 2023-11-29 | End: 2023-11-30 | Stop reason: HOSPADM

## 2023-11-29 RX ORDER — ACETAMINOPHEN 325 MG/1
650 TABLET ORAL EVERY 4 HOURS PRN
Status: DISCONTINUED | OUTPATIENT
Start: 2023-11-29 | End: 2023-11-30 | Stop reason: HOSPADM

## 2023-11-29 RX ORDER — DEXAMETHASONE SODIUM PHOSPHATE 10 MG/ML
6 INJECTION, SOLUTION INTRAMUSCULAR; INTRAVENOUS DAILY
Status: DISCONTINUED | OUTPATIENT
Start: 2023-11-29 | End: 2023-11-30 | Stop reason: HOSPADM

## 2023-11-29 RX ORDER — POLYETHYLENE GLYCOL 3350 17 G/17G
17 POWDER, FOR SOLUTION ORAL DAILY PRN
Status: DISCONTINUED | OUTPATIENT
Start: 2023-11-29 | End: 2023-11-30 | Stop reason: HOSPADM

## 2023-11-29 RX ORDER — CHOLECALCIFEROL (VITAMIN D3) 125 MCG
5 CAPSULE ORAL NIGHTLY PRN
Status: DISCONTINUED | OUTPATIENT
Start: 2023-11-29 | End: 2023-11-30 | Stop reason: HOSPADM

## 2023-11-29 RX ORDER — POLYETHYLENE GLYCOL 3350 17 G
2 POWDER IN PACKET (EA) ORAL
Status: DISCONTINUED | OUTPATIENT
Start: 2023-11-29 | End: 2023-11-30 | Stop reason: HOSPADM

## 2023-11-29 RX ORDER — SODIUM CHLORIDE 0.9 % (FLUSH) 0.9 %
10 SYRINGE (ML) INJECTION EVERY 12 HOURS SCHEDULED
Status: DISCONTINUED | OUTPATIENT
Start: 2023-11-29 | End: 2023-11-30 | Stop reason: HOSPADM

## 2023-11-29 RX ORDER — BUDESONIDE AND FORMOTEROL FUMARATE DIHYDRATE 160; 4.5 UG/1; UG/1
2 AEROSOL RESPIRATORY (INHALATION)
Status: DISCONTINUED | OUTPATIENT
Start: 2023-11-29 | End: 2023-11-30 | Stop reason: HOSPADM

## 2023-11-29 RX ORDER — ACETAMINOPHEN 500 MG
500 TABLET ORAL EVERY 8 HOURS PRN
COMMUNITY

## 2023-11-29 RX ORDER — ARFORMOTEROL TARTRATE 15 UG/2ML
15 SOLUTION RESPIRATORY (INHALATION)
Status: DISCONTINUED | OUTPATIENT
Start: 2023-11-29 | End: 2023-11-29

## 2023-11-29 RX ORDER — BUDESONIDE 0.25 MG/2ML
0.25 INHALANT ORAL
Status: DISCONTINUED | OUTPATIENT
Start: 2023-11-29 | End: 2023-11-29

## 2023-11-29 RX ORDER — NICOTINE 21 MG/24HR
1 PATCH, TRANSDERMAL 24 HOURS TRANSDERMAL EVERY 24 HOURS
Status: DISCONTINUED | OUTPATIENT
Start: 2023-11-29 | End: 2023-11-30 | Stop reason: HOSPADM

## 2023-11-29 RX ORDER — ACETAMINOPHEN 650 MG/1
650 SUPPOSITORY RECTAL EVERY 4 HOURS PRN
Status: DISCONTINUED | OUTPATIENT
Start: 2023-11-29 | End: 2023-11-30 | Stop reason: HOSPADM

## 2023-11-29 RX ORDER — METHYLPREDNISOLONE SODIUM SUCCINATE 125 MG/2ML
125 INJECTION, POWDER, LYOPHILIZED, FOR SOLUTION INTRAMUSCULAR; INTRAVENOUS ONCE
Status: COMPLETED | OUTPATIENT
Start: 2023-11-29 | End: 2023-11-29

## 2023-11-29 RX ORDER — ONDANSETRON 2 MG/ML
4 INJECTION INTRAMUSCULAR; INTRAVENOUS EVERY 6 HOURS PRN
Status: DISCONTINUED | OUTPATIENT
Start: 2023-11-29 | End: 2023-11-30 | Stop reason: HOSPADM

## 2023-11-29 RX ORDER — BISACODYL 5 MG/1
5 TABLET, DELAYED RELEASE ORAL DAILY PRN
Status: DISCONTINUED | OUTPATIENT
Start: 2023-11-29 | End: 2023-11-30 | Stop reason: HOSPADM

## 2023-11-29 RX ORDER — POTASSIUM CHLORIDE 750 MG/1
40 CAPSULE, EXTENDED RELEASE ORAL ONCE
Status: COMPLETED | OUTPATIENT
Start: 2023-11-29 | End: 2023-11-29

## 2023-11-29 RX ORDER — BISACODYL 10 MG
10 SUPPOSITORY, RECTAL RECTAL DAILY PRN
Status: DISCONTINUED | OUTPATIENT
Start: 2023-11-29 | End: 2023-11-30 | Stop reason: HOSPADM

## 2023-11-29 RX ORDER — ACETAMINOPHEN 160 MG/5ML
650 SOLUTION ORAL EVERY 4 HOURS PRN
Status: DISCONTINUED | OUTPATIENT
Start: 2023-11-29 | End: 2023-11-30 | Stop reason: HOSPADM

## 2023-11-29 RX ADMIN — PIPERACILLIN AND TAZOBACTAM 3.38 G: 3; .375 INJECTION, POWDER, LYOPHILIZED, FOR SOLUTION INTRAVENOUS at 22:32

## 2023-11-29 RX ADMIN — HEPARIN SODIUM 5000 UNITS: 5000 INJECTION INTRAVENOUS; SUBCUTANEOUS at 15:41

## 2023-11-29 RX ADMIN — SODIUM CHLORIDE 500 ML: 9 INJECTION, SOLUTION INTRAVENOUS at 08:54

## 2023-11-29 RX ADMIN — PIPERACILLIN AND TAZOBACTAM 3.38 G: 3; .375 INJECTION, POWDER, LYOPHILIZED, FOR SOLUTION INTRAVENOUS at 15:41

## 2023-11-29 RX ADMIN — DEXAMETHASONE 6 MG: 0.5 TABLET ORAL at 15:42

## 2023-11-29 RX ADMIN — BUDESONIDE AND FORMOTEROL FUMARATE DIHYDRATE 2 PUFF: 160; 4.5 AEROSOL RESPIRATORY (INHALATION) at 20:31

## 2023-11-29 RX ADMIN — METHYLPREDNISOLONE SODIUM SUCCINATE 125 MG: 125 INJECTION INTRAMUSCULAR; INTRAVENOUS at 11:13

## 2023-11-29 RX ADMIN — HEPARIN SODIUM 5000 UNITS: 5000 INJECTION INTRAVENOUS; SUBCUTANEOUS at 22:32

## 2023-11-29 RX ADMIN — POTASSIUM CHLORIDE 40 MEQ: 10 CAPSULE, COATED, EXTENDED RELEASE ORAL at 08:55

## 2023-11-29 RX ADMIN — ONDANSETRON 4 MG: 2 INJECTION INTRAMUSCULAR; INTRAVENOUS at 22:35

## 2023-11-29 RX ADMIN — DOCUSATE SODIUM 50MG AND SENNOSIDES 8.6MG 2 TABLET: 8.6; 5 TABLET, FILM COATED ORAL at 22:32

## 2023-11-29 NOTE — Clinical Note
Level of Care: Telemetry [5]   Diagnosis: Hypoxia [143554]   Admitting Physician: ELSA PEREZ [[id number]]   Attending Physician: ELSA PEREZ [[id number]]   Certification: I Certify That Inpatient Hospital Services Are Medically Necessary For Greater Than 2 Midnights

## 2023-11-29 NOTE — ED PROVIDER NOTES
Time: 7:16 AM EST  Date of encounter:  11/29/2023  Independent Historian/Clinical History and Information was obtained by:   Patient    History is limited by: N/A    Chief Complaint: weakness      History of Present Illness:  Patient is a 73 y.o. year old female who presents to the emergency department for evaluation of weakness.  The patient reports that 1 week ago she started feeling ill with generalized bodyaches and low-grade fever for several days.  Thought it could be COVID and had several negative COVID test without could be influenza.  States that symptoms are gradually worsening.  States that she does not have a cough worse than baseline but has no O2 requirement at home.  Is having some mild generalized lower abdominal tenderness and her urine appears darker than normal. Has nausea at baseline, takes zofran twice daily, no vomiting or diarrhea.     HPI    Patient Care Team  Primary Care Provider: Nahun Chamorro APRN    Past Medical History:     No Known Allergies  Past Medical History:   Diagnosis Date    Arthritis November 2022    R.A.    Basal cell carcinoma     Cancer     Cancer     squamos cell carcinoma    COPD (chronic obstructive pulmonary disease)     Coronary artery disease     Diverticulosis can't remember    Heart murmur can't remember    mitral valve regurgitation    High blood pressure     High cholesterol     HL (hearing loss) 2011    pretty bad    Inflammatory bowel disease can't remember    microscopic colitis    Scoliosis 1983    was diagnoised while I was serving in [employer]    Visual impairment     wear glasses     Past Surgical History:   Procedure Laterality Date    COLONOSCOPY  2017    HYSTERECTOMY      MOLE REMOVAL      OTHER SURGICAL HISTORY      fallopian tube dilation    TUBAL ABDOMINAL LIGATION       Family History   Problem Relation Age of Onset    Diabetes Mother     Alcohol abuse Mother     Ovarian cancer Sister     Cancer Sister         ovarian cancer    Alcohol abuse  Father     Heart disease Father     Hyperlipidemia Father     Alcohol abuse Brother     Drug abuse Brother        Home Medications:  Prior to Admission medications    Medication Sig Start Date End Date Taking? Authorizing Provider   amLODIPine (NORVASC) 10 MG tablet Take 1 tablet by mouth Daily. 3/14/23   Nahun Chamorro APRN   aspirin 81 MG EC tablet Take 1 tablet by mouth Daily. 3/14/23   Nahun Chamorro APRN   carvedilol (COREG) 12.5 MG tablet Take 1 tablet by mouth 2 (Two) Times a Day With Meals. 3/14/23   Nahun Chamorro APRN   ezetimibe (ZETIA) 10 MG tablet Take 1 tablet by mouth Daily. 3/14/23   Nahun Chamorro APRN   losartan (COZAAR) 50 MG tablet Take 1 tablet by mouth Daily. 23   Nahun Chamorro APRN   ondansetron (ZOFRAN) 8 MG tablet Take 1 tablet by mouth Every 8 (Eight) Hours As Needed for Nausea or Vomiting. 10/6/23   Nahun Chamorro APRN   pravastatin (PRAVACHOL) 40 MG tablet Take 1 tablet by mouth Daily. 3/14/23   Nahun Chamorro APRN        Social History:   Social History     Tobacco Use    Smoking status: Former     Packs/day: 0     Types: Cigarettes     Start date: 1983     Quit date: 2022     Years since quittin.7    Smokeless tobacco: Never    Tobacco comments:     started smoking at age 4; smoked 10 ciagarette (s) per day   Vaping Use    Vaping Use: Never used   Substance Use Topics    Alcohol use: Never    Drug use: Never         Review of Systems:  Review of Systems   Constitutional:  Positive for activity change, chills and fever.   HENT:  Negative for congestion and sore throat.    Respiratory:  Positive for shortness of breath. Negative for cough.    Cardiovascular:  Negative for chest pain and palpitations.   Gastrointestinal:  Positive for abdominal pain and nausea (at baseline). Negative for diarrhea and vomiting.   Genitourinary:  Negative for dysuria.   Musculoskeletal:  Positive for myalgias.   Neurological:  Positive for weakness. Negative for headaches.   All other systems  "reviewed and are negative.       Physical Exam:  /55   Pulse 63   Temp 97.6 °F (36.4 °C) (Oral)   Resp 16   Ht 175.3 cm (69\")   Wt 58 kg (127 lb 13.9 oz)   SpO2 93%   BMI 18.88 kg/m²     Physical Exam  Vitals and nursing note reviewed.   Constitutional:       Appearance: Normal appearance. She is ill-appearing. She is not toxic-appearing.   HENT:      Head: Normocephalic.      Nose: Nose normal.      Mouth/Throat:      Mouth: Mucous membranes are moist.   Eyes:      Conjunctiva/sclera: Conjunctivae normal.   Cardiovascular:      Rate and Rhythm: Normal rate and regular rhythm.      Pulses: Normal pulses.      Heart sounds: Normal heart sounds.   Pulmonary:      Effort: Pulmonary effort is normal.      Breath sounds: Normal breath sounds. No rales.      Comments: Prolonged expiration  Abdominal:      Tenderness: There is abdominal tenderness (mild generalized lower abdominal). There is no right CVA tenderness, left CVA tenderness, guarding or rebound.   Musculoskeletal:         General: Normal range of motion.      Cervical back: Normal range of motion.   Skin:     General: Skin is warm and dry.      Capillary Refill: Capillary refill takes less than 2 seconds.   Neurological:      Mental Status: She is alert.                  Procedures:  Procedures      Medical Decision Making:      Comorbidities that affect care:    Cancer, COPD, Hypertension    External Notes reviewed:    Previous Clinic Note: Family medicine visit on 3/14/2023 for medication refills.      The following orders were placed and all results were independently analyzed by me:  Orders Placed This Encounter   Procedures    COVID-19, FLU A/B, RSV PCR 1 HR TAT - Swab, Nasopharynx    Urine Culture - Urine,    XR Chest 1 View    [place] Draw    Comprehensive Metabolic Panel    Single High Sensitivity Troponin T    Magnesium    Urinalysis With Microscopic If Indicated (No Culture) - Urine, Clean Catch    CBC Auto Differential    Lipase    " Urinalysis, Microscopic Only - Urine, Clean Catch    BNP    NPO Diet NPO Type: Strict NPO    Undress & Gown    Continuous Pulse Oximetry    Vital Signs    Orthostatic Blood Pressure    Inpatient Hospitalist Consult    Oxygen Therapy- Nasal Cannula; Titrate 1-6 LPM Per SpO2; 90 - 95%    POC Glucose Once    ECG 12 Lead ED Triage Standing Order; Weak / Dizzy / AMS    Insert Peripheral IV    Fall Precautions    CBC & Differential    Green Top (Gel)    Lavender Top    Gold Top - SST    Light Blue Top       Medications Given in the Emergency Department:  Medications   sodium chloride 0.9 % flush 10 mL (has no administration in time range)   methylPREDNISolone sodium succinate (SOLU-Medrol) injection 125 mg (has no administration in time range)   sodium chloride 0.9 % bolus 500 mL (500 mL Intravenous New Bag 11/29/23 0854)   potassium chloride (MICRO-K/KLOR-CON) CR capsule (40 mEq Oral Given 11/29/23 0855)        ED Course:         Labs:    Lab Results (last 24 hours)       Procedure Component Value Units Date/Time    CBC & Differential [[id number]]  (Abnormal) Collected: 11/29/23 0538    Specimen: Blood from Arm, Right Updated: 11/29/23 0600    Narrative:      The following orders were created for panel order CBC & Differential.  Procedure                               Abnormality         Status                     ---------                               -----------         ------                     CBC Auto Differential[[id number]]        Abnormal            Final result                 Please view results for these tests on the individual orders.    Comprehensive Metabolic Panel [[id number]]  (Abnormal) Collected: 11/29/23 0538    Specimen: Blood from Arm, Right Updated: 11/29/23 0620     Glucose 143 mg/dL      BUN 21 mg/dL      Creatinine 1.19 mg/dL      Sodium 135 mmol/L      Potassium 3.2 mmol/L      Chloride 95 mmol/L      CO2 27.1 mmol/L      Calcium 9.1 mg/dL      Total Protein 7.9 g/dL      Albumin 3.5 g/dL       ALT (SGPT) 5 U/L      AST (SGOT) 11 U/L      Alkaline Phosphatase 81 U/L      Total Bilirubin 0.7 mg/dL      Globulin 4.4 gm/dL      A/G Ratio 0.8 g/dL      BUN/Creatinine Ratio 17.6     Anion Gap 12.9 mmol/L      eGFR 49.0 mL/min/1.73     Narrative:      GFR Normal >60  Chronic Kidney Disease <60  Kidney Failure <15    The GFR formula is only valid for adults with stable renal function between ages 18 and 70.    Single High Sensitivity Troponin T [[id number]]  (Normal) Collected: 11/29/23 0538    Specimen: Blood from Arm, Right Updated: 11/29/23 0620     HS Troponin T 9 ng/L     Narrative:      High Sensitive Troponin T Reference Range:  <14.0 ng/L- Negative Female for AMI  <22.0 ng/L- Negative Male for AMI  >=14 - Abnormal Female indicating possible myocardial injury.  >=22 - Abnormal Male indicating possible myocardial injury.   Clinicians would have to utilize clinical acumen, EKG, Troponin, and serial changes to determine if it is an Acute Myocardial Infarction or myocardial injury due to an underlying chronic condition.         Magnesium [[id number]]  (Normal) Collected: 11/29/23 0538    Specimen: Blood from Arm, Right Updated: 11/29/23 0620     Magnesium 1.9 mg/dL     CBC Auto Differential [[id number]]  (Abnormal) Collected: 11/29/23 0538    Specimen: Blood from Arm, Right Updated: 11/29/23 0600     WBC 9.45 10*3/mm3      RBC 4.04 10*6/mm3      Hemoglobin 13.0 g/dL      Hematocrit 38.4 %      MCV 95.0 fL      MCH 32.2 pg      MCHC 33.9 g/dL      RDW 11.9 %      RDW-SD 41.9 fl      MPV 10.5 fL      Platelets 251 10*3/mm3      Neutrophil % 78.3 %      Lymphocyte % 11.2 %      Monocyte % 9.5 %      Eosinophil % 0.2 %      Basophil % 0.4 %      Immature Grans % 0.4 %      Neutrophils, Absolute 7.39 10*3/mm3      Lymphocytes, Absolute 1.06 10*3/mm3      Monocytes, Absolute 0.90 10*3/mm3      Eosinophils, Absolute 0.02 10*3/mm3      Basophils, Absolute 0.04 10*3/mm3      Immature Grans, Absolute 0.04 10*3/mm3       nRBC 0.0 /100 WBC     Lipase [[id number]]  (Normal) Collected: 11/29/23 0538    Specimen: Blood from Arm, Right Updated: 11/29/23 0755     Lipase 26 U/L     BNP [[id number]]  (Normal) Collected: 11/29/23 0538    Specimen: Blood from Arm, Right Updated: 11/29/23 0953     proBNP 152.4 pg/mL     Narrative:      This assay is used as an aid in the diagnosis of individuals suspected of having heart failure. It can be used as an aid in the diagnosis of acute decompensated heart failure (ADHF) in patients presenting with signs and symptoms of ADHF to the emergency department (ED). In addition, NT-proBNP of <300 pg/mL indicates ADHF is not likely.    Age Range Result Interpretation  NT-proBNP Concentration (pg/mL:      <50             Positive            >450                   Gray                 300-450                    Negative             <300    50-75           Positive            >900                  Gray                300-900                  Negative            <300      >75             Positive            >1800                  Gray                300-1800                  Negative            <300    Urinalysis With Microscopic If Indicated (No Culture) - Urine, Clean Catch [[id number]]  (Abnormal) Collected: 11/29/23 0827    Specimen: Urine, Clean Catch Updated: 11/29/23 0849     Color, UA Dark Yellow     Appearance, UA Cloudy     pH, UA 5.5     Specific Gravity, UA 1.023     Glucose, UA Negative     Ketones, UA Negative     Bilirubin, UA Moderate (2+)     Blood, UA Moderate (2+)     Protein, UA >=300 mg/dL (3+)     Leuk Esterase, UA Trace     Nitrite, UA Negative     Urobilinogen, UA 1.0 E.U./dL    COVID-19, FLU A/B, RSV PCR 1 HR TAT - Swab, Nasopharynx [[id number]]  (Abnormal) Collected: 11/29/23 0827    Specimen: Swab from Nasopharynx Updated: 11/29/23 0940     COVID19 Detected     Influenza A PCR Not Detected     Influenza B PCR Not Detected     RSV, PCR Not Detected    Narrative:      Fact sheet for  providers: https://www.fda.gov/media/940079/download    Fact sheet for patients: https://www.fda.gov/media/797730/download    Test performed by PCR.    Urinalysis, Microscopic Only - Urine, Clean Catch [[id number]]  (Abnormal) Collected: 11/29/23 0827    Specimen: Urine, Clean Catch Updated: 11/29/23 0849     RBC, UA 3-5 /HPF      WBC, UA 6-10 /HPF      Bacteria, UA 1+ /HPF      Squamous Epithelial Cells, UA 3-6 /HPF      Hyaline Casts, UA 3-6 /LPF      WBC Casts 0-2 /LPF      Waxy Casts 0-2 /LPF      Granular Casts, UA 0-2 /LPF      Mucus, UA Small/1+ /HPF      Methodology Manual Light Microscopy    Urine Culture - Urine, Urine, Clean Catch [[id number]] Collected: 11/29/23 0827    Specimen: Urine, Clean Catch Updated: 11/29/23 0910             Imaging:    XR Chest 1 View    Result Date: 11/29/2023  PROCEDURE: XR CHEST 1 VW  COMPARISON: 2/1/2022.  INDICATIONS: Weak/Dizzy/AMS.  FINDINGS:  A single AP (or PA) upright portable chest radiograph was performed.  No cardiac enlargement is seen.  No acute infiltrate is appreciated.  No pleural effusion or pneumothorax is identified.  Chronic calcified granulomatous disease involves the chest.  There is emphysematous contour of the lungs.  The thoracic aorta is atherosclerotic.  No significant interval change is seen since the prior study (or studies).        No acute infiltrate is appreciated.  Severe emphysematous changes of the lungs are suspected.     Please note that portions of this note were completed with a voice recognition program.  MIKY RICE JR, MD       Electronically Signed and Approved By: MIKY RICE JR, MD on 11/29/2023 at 6:37                 Differential Diagnosis and Discussion:    Weakness: Based on the patient's history, signs, and symptoms, the diffential diagnosis includes but is not limited to meningitis, stroke, sepsis, subarachnoid hemorrhage, intracranial bleeding, encephalitis, acute uti, dehydration, MS, myasthenia gravis, Guillan [place],  migraine variant, neuromuscular disorders vertigo, electrolyte imbalance, and metabolic disorders.    All labs were reviewed and interpreted by me.  All X-rays impressions were independently interpreted by me.  EKG was interpreted by supervising attending.    MDM     Amount and/or Complexity of Data Reviewed  Decide to obtain previous medical records or to obtain history from someone other than the patient: yes                 Patient Care Considerations:    CT ABDOMEN AND PELVIS: I considered ordering a CT scan of the abdomen and pelvis however lower abdomen benign, no peritoneal signs      Consultants/Shared Management Plan:    Hospitalist: I have discussed the case with Dr. Brambila, [facility] medicine, who agrees to accept the patient for admission.    Social Determinants of Health:    Patient is independent, reliable, and has access to care.       Disposition and Care Coordination:    73-year-old female presents to the ED for 1 week of gradual worsening weakness.  Has had negative home COVID test.  History of COPD, no productive cough.  Denies dysuria but states urine does look a little darker than normal.Nausea is at baseline.  Abdomen is benign.  Has no home O2 requirement.     CBC is without leukocytosis, anemia requiring transfusion.  CMP is with mild hypokalemia, will replete p.o., magnesium is WNL.  Creatinine elevated from baseline baseline is 0.8 currently 1.19 with normal BUN to creatinine ratio but GFR is 49. Normal LFTs.  Lipase WNL.  BNP and trop unremarakable. Denies chest pain or severe SOA but pt is hypoxic and ambulatory O2 on RA dropped to 83%. Will consult for admission. Urine culture in process. CXR- I do not appreciate any acute cardiopulmonary abnormality, severe emphysema changes noted.     Consult with hospital medicine who is in agreement with admission.     Admit:   Through independent evaluation of the patient's history, physical, and imperical data, the patient meets criteria for  observation/admission to the hospital.        Final diagnoses:   COVID-19   Hypoxia        ED Disposition       ED Disposition   Decision to Admit    Condition   --    Comment   --               This medical record created using voice recognition software.             Gwen Salas PA-C  11/29/23 1007

## 2023-11-29 NOTE — H&P
[facility]  Date: 2023   Patient Name: Dorie Head  : 1950  MRN: [id number]  Primary Care Physician:  Nahun Chamorro APRN  Date of admission: 2023    Subjective   Subjective     Chief Complaint: shortness of breath    HPI:    Dorie Head is a 73 y.o. female presents to the hospital with weakness and fatigue.  Patient began having general body aches, weakness, fatigue and worsening shortness of breath about 1 week ago.  No significant change in her chronic cough.  Patient took COVID-19 test at home because she thought she had COVID-19 but this was negative at home.  However she continued to be symptomatic so she presented to the emergency department.  In the ER she actually was noted to be positive for COVID-19.  She also noted to have some pyuria.  Chest x-ray shows severe emphysematous changes but no acute infiltrate.  On room air her oxygen saturation is in the low 90s but when she ambulates she drops into the low 80s.  Due to this we were asked to admit her for further inpatient management      Personal History     Past Medical History:  Past Medical History:   Diagnosis Date    Arthritis 2022    R.A.    Basal cell carcinoma     Cancer     Cancer     squamos cell carcinoma    COPD (chronic obstructive pulmonary disease)     Coronary artery disease     Diverticulosis can't remember    Heart murmur can't remember    mitral valve regurgitation    High blood pressure     High cholesterol     HL (hearing loss)     pretty bad    Inflammatory bowel disease can't remember    microscopic colitis    Scoliosis     was diagnoised while I was serving in [employer]    Visual impairment     wear glasses       Past Surgical History:  Past Surgical History:   Procedure Laterality Date    COLONOSCOPY  2017    HYSTERECTOMY      MOLE REMOVAL      OTHER SURGICAL HISTORY      fallopian tube dilation    TUBAL ABDOMINAL LIGATION         Family History:    Family History   Problem Relation Age of Onset    Diabetes Mother     Alcohol abuse Mother     Ovarian cancer Sister     Cancer Sister         ovarian cancer    Alcohol abuse Father     Heart disease Father     Hyperlipidemia Father     Alcohol abuse Brother     Drug abuse Brother        Social History:   Social History     Socioeconomic History    Marital status: Single   Tobacco Use    Smoking status: Former     Packs/day: 0     Types: Cigarettes     Start date: 1983     Quit date: 2022     Years since quittin.7    Smokeless tobacco: Never    Tobacco comments:     started smoking at age 4; smoked 10 ciagarette (s) per day   Vaping Use    Vaping Use: Never used   Substance and Sexual Activity    Alcohol use: Never    Drug use: Never    Sexual activity: Not Currently     Partners: Male     Birth control/protection: Tubal ligation, Birth control pill, Hysterectomy       Home Medications:  amLODIPine, aspirin, carvedilol, ezetimibe, losartan, ondansetron, and pravastatin    Allergies:  No Known Allergies    Review of Systems  Pertinent systems were reviewed and negative except for: Noted above in HPI    Objective   Objective     Vitals:   Temp:  [97.6 °F (36.4 °C)] 97.6 °F (36.4 °C)  Heart Rate:  [56-79] 63  Resp:  [16] 16  BP: ()/() 119/55    Physical Exam    Constitutional: Awake, alert, no acute distress   Respiratory: Significantly diminished breath sounds to auscultation bilaterally, nonlabored respirations    Cardiovascular: RRR, no murmurs, rubs, or gallops, palpable pedal pulses bilaterally   Gastrointestinal: Positive bowel sounds, soft, nontender, nondistended   Psychiatric: Appropriate affect, cooperative   Neurologic: Oriented x 3, speech clear   Skin: No rashes         Assessment & Plan   Assessment / Plan     Assessment/Plan:   Acute hypoxic respiratory failure  Acute exacerbation of COPD with hypoxia  COVID-19 respiratory syndrome  History of coronary disease without acute  coronary syndrome currently  Hypertension  Hyperlipidemia         Patient is admitted to a medical bed for further medical management  The patient will be treated with IV antibiotics (Zosyn)  Inhaled bronchodilators  Systemic corticosteroids with Decadron  Supplemental oxygen. Titrate oxygen for saturation of 90 % or greater.    Pulmonary hygiene   Perform walk oximetry prior to discharge  I reviewed the patient's chest imaging - consolidations and opacities consistent with multifocal pneumonia  Check Procalcitonin, Ferritin, LDH, D-dimer and CRP  Follow CBC for surveillance of acute blood loss or thrombocytopenia   Metabolic panel in the morning to evaluate electrolytes and renal function  Reviewed today's labs  Reviewed telemetry  Discussed with ER physician  Risk of primary complaint: patient is at significant risk of morbidity if primary complaint is not treated especially considering the patient's comorbidities.  DVT prophylaxis:  Medical and mechanical DVT prophylaxis orders are present.    CODE STATUS:    Code Status (Patient has no pulse and is not breathing): CPR (Attempt to Resuscitate)  Medical Interventions (Patient has pulse or is breathing): Full Support      Admission Status:  I believe this patient meets inpatient status but I was told by UR that she only meets observation status so she will be admitted to observation.    Electronically signed by Willem Brambila DO, 11/29/23, 10:40 AM EST.

## 2023-11-29 NOTE — CONSULTS
Pulmonary / Critical Care Consult Note      Patient Name: Dorie Head  : 1950  MRN: [id number]  Primary Care Physician:  Nahun Chamorro APRN  Referring Physician: Emre Elias MD  Date of admission: 2023    Subjective   Subjective     Reason for Consult/ Chief Complaint:   Short of breath    HPI:  Dorie Head is a 73 y.o. female with COPD, not on oxygen does not see pulmonary, came in feeling poorly.  Had a few days of increasing shortness of breath, productive cough and wheezing.  The emergency department the patient had increased work of breathing.  O2 saturations are in the 90s but drops down into the 80s occasionally.  She is currently on 2 L of oxygen.  COVID-19 swab was positive.  Chest x-ray shows chronic lung disease.  She has been placed on steroids, nebulizers and antibiotics and is being admitted to the hospital.  She is a former smoker and quit smoking 2 years ago.    Review of Systems  Constitutional symptoms: Fatigue and weakness, otherwise denied complaints   Ear, nose, throat: Denied complaints  Cardiovascular:  Denied complaints  Respiratory: Dyspnea, cough, wheeze, otherwise denied complaints  Gastrointestinal: Denied complaints  Musculoskeletal: Denied complaints  Genitourinary: Denied complaints  Allergy / Immunology: Denied complaints  Hematologic: Denied complaints  Neurologic: Denied complaints  Skin: Denied complaints  Endocrine: Denied complaints  Psychiatric: Denied complaints      Personal History     Past Medical History:   Diagnosis Date    Arthritis 2022    R.A.    Basal cell carcinoma     Cancer     Cancer     squamos cell carcinoma    COPD (chronic obstructive pulmonary disease)     Coronary artery disease     Diverticulosis can't remember    Heart murmur can't remember    mitral valve regurgitation    High blood pressure     High cholesterol     HL (hearing loss)     pretty bad    Inflammatory bowel disease can't remember    microscopic colitis     Scoliosis 1983    was diagnoised while I was serving in the [employer]    Visual impairment     wear glasses       Past Surgical History:   Procedure Laterality Date    COLONOSCOPY  2017    HYSTERECTOMY      MOLE REMOVAL      OTHER SURGICAL HISTORY      fallopian tube dilation    TUBAL ABDOMINAL LIGATION         Family History: family history includes Alcohol abuse in her brother, father, and mother; Cancer in her sister; Diabetes in her mother; Drug abuse in her brother; Heart disease in her father; Hyperlipidemia in her father; Ovarian cancer in her sister. Otherwise pertinent FHx was reviewed and not pertinent to current issue.    Social History:  reports that she quit smoking about 21 months ago. Her smoking use included cigarettes. She started smoking about 40 years ago. She has never used smokeless tobacco. She reports that she does not drink alcohol and does not use drugs.    Home Medications:  acetaminophen, amLODIPine, aspirin, carvedilol, ezetimibe, losartan, ondansetron, and pravastatin    Allergies:  No Known Allergies    Objective    Objective     Vitals:   Temp:  [97.6 °F (36.4 °C)] 97.6 °F (36.4 °C)  Heart Rate:  [56-79] 60  Resp:  [16] 16  BP: ()/() 119/55    Physical Exam:  Vital Signs Reviewed   General: Thin chronically ill-appearing female, Alert, NAD.    HEENT:  PERRL, EOMI.  OP, nares clear, no sinus tenderness  Neck:  Supple, no JVD, no thyromegaly  Lymph: no axillary, cervical, supraclavicular lymphadenopathy noted bilaterally  Chest:  good aeration, coarse wheezing bilaterally, tympanic to percussion bilaterally, pursed lip breathing noted  CV: RRR, no MGR, pulses 2+, equal.  Abd:  Soft, NT, ND, + BS, no HSM  EXT:  no clubbing, no cyanosis, no edema, no joint tenderness  Neuro:  A&Ox3, CN grossly intact, no focal deficits.  Skin: No rashes or lesions noted      Result Review    Result Review:  I have personally reviewed the results from the time of this admission to 11/29/2023  12:26 EST and agree with these findings:  [x]  Laboratory  []  Microbiology  [x]  Radiology  [x]  EKG/Telemetry   [x]  Cardiology/Vascular   []  Pathology  []  Old records  []  Other:  Most notable findings include:   COVID-19 swab positive  Chest x-ray with chronic pulmonary scarring        Lab 11/29/23  0538   WBC 9.45   HEMOGLOBIN 13.0   HEMATOCRIT 38.4   PLATELETS 251   SODIUM 135*   POTASSIUM 3.2*   CHLORIDE 95*   CO2 27.1   BUN 21   CREATININE 1.19*   GLUCOSE 143*   CALCIUM 9.1   TOTAL PROTEIN 7.9   ALBUMIN 3.5   GLOBULIN 4.4         Assessment & Plan   Assessment / Plan     Active Hospital Problems:  Active Hospital Problems    Diagnosis     **Hypoxia          Impression:  Acute hypoxemic respiratory failure  COVID-19 lower respiratory tract infection  Possible community-acquired bacterial pneumonia  Tobacco use of cigarettes in remission  Hyponatremia, clinically insignificant  Hypokalemia    Plan:  Will plan for 10 days of Decadron  Agree with Symbicort and bronchopulmonary hygiene.  Ultimately will need to be discharged on triple inhaler therapy and flutter valve  Wean O2 to keep SPO2 greater than 90%  On Zosyn.  Will check procalcitonin and cultures.  If negative by tomorrow would recommend discontinuing antibiotics  No indication to trend inflammatory markers at this time  Chest x-ray personally.  Appears to have chronic pulmonary scarring.  Will check noncontrast chest CT    DVT prophylaxis:  No DVT prophylaxis order currently exists.     Code Status and Medical Interventions:   Ordered at: 11/29/23 1040     Code Status (Patient has no pulse and is not breathing):    CPR (Attempt to Resuscitate)     Medical Interventions (Patient has pulse or is breathing):    Full Support        Labs, imaging, microbiology, notes and medications personally reviewed  Discussed with primary    Thank you for involving me in the care of this patient.    Electronically signed by Duane Toney MD, 11/29/23, 3:35 PM  EST.

## 2023-11-29 NOTE — PLAN OF CARE
Problem: Adult Inpatient Plan of Care  Goal: Plan of Care Review  Outcome: Ongoing, Progressing  Flowsheets (Taken 11/29/2023 1815)  Progress: improving  Plan of Care Reviewed With: patient  Outcome Evaluation: Patient alert and oriented. [place]. Patient is on 3L NC. No complaints of pain throughout shift. Chest CT completed, results pending. Standby assist to bathroom. Continuing with plan of care.   Goal Outcome Evaluation:  Plan of Care Reviewed With: patient        Progress: improving  Outcome Evaluation: Patient alert and oriented. [place]. Patient is on 3L NC. No complaints of pain throughout shift. Chest CT completed, results pending. Standby assist to bathroom. Continuing with plan of care.

## 2023-11-29 NOTE — PROGRESS NOTES
Respiratory Therapist Broncho-Pulmonary Hygiene Progress Note      Patient Name:  Dorie Head  YOB: 1950    Dorie Head meets the qualification for Level 1 of the Bronco-Pulmonary Hygiene Protocol. This was based on my daily patient assessment and includes review of chest x-ray results, cough ability and quality, oxygenation, secretions or risk for secretion development and patient mobility.     Broncho-Pulmonary Hygiene Assessment:    Level of Movement: Actively changing positions without assistance  Alert/ oriented/ cooperative    Breath Sounds: Diminished and/or coarse rhonchi    Cough: Strong, effective    Chest X-Ray: Possible signs of consolidation and/or atelectasis or clear.     Sputum Productions: None or small amount of thin or watery secretions with effective cough    History and Physical: Chronic condition    SpO2 to Oxygen Need: greater than 92% on room air or  less than 3L nasal canula    Current SpO2 is: 93% on 3L Nasal Cannula     Based on this information, I have completed the following interventions: Teach/Instruct patient on cough and deep breathe      Electronically signed by Eve Collins RRT, 11/29/23, 3:52 PM EST.

## 2023-11-30 ENCOUNTER — READMISSION MANAGEMENT (OUTPATIENT)
Dept: CALL CENTER | Facility: HOSPITAL | Age: 73
End: 2023-11-30
Payer: COMMERCIAL

## 2023-11-30 VITALS
HEART RATE: 63 BPM | RESPIRATION RATE: 20 BRPM | DIASTOLIC BLOOD PRESSURE: 51 MMHG | SYSTOLIC BLOOD PRESSURE: 154 MMHG | WEIGHT: 127.87 LBS | TEMPERATURE: 97.2 F | BODY MASS INDEX: 18.94 KG/M2 | OXYGEN SATURATION: 93 % | HEIGHT: 69 IN

## 2023-11-30 LAB
ALBUMIN SERPL-MCNC: 3 G/DL (ref 3.5–5.2)
ALBUMIN/GLOB SERPL: 0.9 G/DL
ALP SERPL-CCNC: 67 U/L (ref 39–117)
ALT SERPL W P-5'-P-CCNC: <5 U/L (ref 1–33)
ANION GAP SERPL CALCULATED.3IONS-SCNC: 12.7 MMOL/L (ref 5–15)
AST SERPL-CCNC: 9 U/L (ref 1–32)
BACTERIA SPEC AEROBE CULT: NO GROWTH
BASOPHILS # BLD AUTO: 0.01 10*3/MM3 (ref 0–0.2)
BASOPHILS NFR BLD AUTO: 0.2 % (ref 0–1.5)
BILIRUB SERPL-MCNC: 0.4 MG/DL (ref 0–1.2)
BUN SERPL-MCNC: 27 MG/DL (ref 8–23)
BUN/CREAT SERPL: 29 (ref 7–25)
CALCIUM SPEC-SCNC: 9.2 MG/DL (ref 8.6–10.5)
CHLORIDE SERPL-SCNC: 101 MMOL/L (ref 98–107)
CO2 SERPL-SCNC: 20.3 MMOL/L (ref 22–29)
CREAT SERPL-MCNC: 0.93 MG/DL (ref 0.57–1)
DEPRECATED RDW RBC AUTO: 40.7 FL (ref 37–54)
EGFRCR SERPLBLD CKD-EPI 2021: 65.8 ML/MIN/1.73
EOSINOPHIL # BLD AUTO: 0 10*3/MM3 (ref 0–0.4)
EOSINOPHIL NFR BLD AUTO: 0 % (ref 0.3–6.2)
ERYTHROCYTE [DISTWIDTH] IN BLOOD BY AUTOMATED COUNT: 11.7 % (ref 12.3–15.4)
GLOBULIN UR ELPH-MCNC: 3.5 GM/DL
GLUCOSE SERPL-MCNC: 188 MG/DL (ref 65–99)
HCT VFR BLD AUTO: 34.4 % (ref 34–46.6)
HGB BLD-MCNC: 11.5 G/DL (ref 12–15.9)
IMM GRANULOCYTES # BLD AUTO: 0.04 10*3/MM3 (ref 0–0.05)
IMM GRANULOCYTES NFR BLD AUTO: 0.7 % (ref 0–0.5)
LYMPHOCYTES # BLD AUTO: 0.57 10*3/MM3 (ref 0.7–3.1)
LYMPHOCYTES NFR BLD AUTO: 9.7 % (ref 19.6–45.3)
MAGNESIUM SERPL-MCNC: 1.9 MG/DL (ref 1.6–2.4)
MCH RBC QN AUTO: 31.6 PG (ref 26.6–33)
MCHC RBC AUTO-ENTMCNC: 33.4 G/DL (ref 31.5–35.7)
MCV RBC AUTO: 94.5 FL (ref 79–97)
MONOCYTES # BLD AUTO: 0.27 10*3/MM3 (ref 0.1–0.9)
MONOCYTES NFR BLD AUTO: 4.6 % (ref 5–12)
NEUTROPHILS NFR BLD AUTO: 4.96 10*3/MM3 (ref 1.7–7)
NEUTROPHILS NFR BLD AUTO: 84.8 % (ref 42.7–76)
NRBC BLD AUTO-RTO: 0 /100 WBC (ref 0–0.2)
PHOSPHATE SERPL-MCNC: 3.8 MG/DL (ref 2.5–4.5)
PLATELET # BLD AUTO: 236 10*3/MM3 (ref 140–450)
PMV BLD AUTO: 10.6 FL (ref 6–12)
POTASSIUM SERPL-SCNC: 3.9 MMOL/L (ref 3.5–5.2)
PROT SERPL-MCNC: 6.5 G/DL (ref 6–8.5)
RBC # BLD AUTO: 3.64 10*6/MM3 (ref 3.77–5.28)
SODIUM SERPL-SCNC: 134 MMOL/L (ref 136–145)
WBC NRBC COR # BLD AUTO: 5.85 10*3/MM3 (ref 3.4–10.8)

## 2023-11-30 PROCEDURE — 25010000002 HEPARIN (PORCINE) PER 1000 UNITS: Performed by: FAMILY MEDICINE

## 2023-11-30 PROCEDURE — 99238 HOSP IP/OBS DSCHRG MGMT 30/<: CPT | Performed by: INTERNAL MEDICINE

## 2023-11-30 PROCEDURE — 94799 UNLISTED PULMONARY SVC/PX: CPT

## 2023-11-30 PROCEDURE — 84100 ASSAY OF PHOSPHORUS: CPT | Performed by: FAMILY MEDICINE

## 2023-11-30 PROCEDURE — G0378 HOSPITAL OBSERVATION PER HR: HCPCS

## 2023-11-30 PROCEDURE — 87205 SMEAR GRAM STAIN: CPT | Performed by: FAMILY MEDICINE

## 2023-11-30 PROCEDURE — 25010000002 DEXAMETHASONE SODIUM PHOSPHATE 10 MG/ML SOLUTION: Performed by: FAMILY MEDICINE

## 2023-11-30 PROCEDURE — 80053 COMPREHEN METABOLIC PANEL: CPT | Performed by: FAMILY MEDICINE

## 2023-11-30 PROCEDURE — 99215 OFFICE O/P EST HI 40 MIN: CPT | Performed by: INTERNAL MEDICINE

## 2023-11-30 PROCEDURE — 96366 THER/PROPH/DIAG IV INF ADDON: CPT

## 2023-11-30 PROCEDURE — 96372 THER/PROPH/DIAG INJ SC/IM: CPT

## 2023-11-30 PROCEDURE — 25010000002 MAGNESIUM SULFATE IN D5W 1G/100ML (PREMIX) 1-5 GM/100ML-% SOLUTION: Performed by: INTERNAL MEDICINE

## 2023-11-30 PROCEDURE — 94618 PULMONARY STRESS TESTING: CPT

## 2023-11-30 PROCEDURE — 87070 CULTURE OTHR SPECIMN AEROBIC: CPT | Performed by: FAMILY MEDICINE

## 2023-11-30 PROCEDURE — 83735 ASSAY OF MAGNESIUM: CPT | Performed by: FAMILY MEDICINE

## 2023-11-30 PROCEDURE — 94761 N-INVAS EAR/PLS OXIMETRY MLT: CPT

## 2023-11-30 PROCEDURE — 25010000002 PIPERACILLIN SOD-TAZOBACTAM PER 1 G: Performed by: FAMILY MEDICINE

## 2023-11-30 PROCEDURE — 85025 COMPLETE CBC W/AUTO DIFF WBC: CPT | Performed by: FAMILY MEDICINE

## 2023-11-30 PROCEDURE — 96375 TX/PRO/DX INJ NEW DRUG ADDON: CPT

## 2023-11-30 PROCEDURE — 25010000002 ENOXAPARIN PER 10 MG: Performed by: INTERNAL MEDICINE

## 2023-11-30 RX ORDER — TIOTROPIUM BROMIDE 18 UG/1
1 CAPSULE ORAL; RESPIRATORY (INHALATION)
Qty: 30 CAPSULE | Refills: 0 | Status: SHIPPED | OUTPATIENT
Start: 2023-11-30

## 2023-11-30 RX ORDER — TIOTROPIUM BROMIDE 18 UG/1
1 CAPSULE ORAL; RESPIRATORY (INHALATION)
Qty: 30 CAPSULE | Refills: 0 | Status: SHIPPED | OUTPATIENT
Start: 2023-11-30 | End: 2023-11-30 | Stop reason: SDUPTHER

## 2023-11-30 RX ORDER — CEFDINIR 300 MG/1
300 CAPSULE ORAL EVERY 12 HOURS SCHEDULED
Qty: 14 CAPSULE | Refills: 0 | Status: SHIPPED | OUTPATIENT
Start: 2023-11-30 | End: 2023-12-07

## 2023-11-30 RX ORDER — BUDESONIDE AND FORMOTEROL FUMARATE DIHYDRATE 160; 4.5 UG/1; UG/1
2 AEROSOL RESPIRATORY (INHALATION)
Qty: 20.4 G | Refills: 0 | Status: SHIPPED | OUTPATIENT
Start: 2023-11-30 | End: 2023-11-30 | Stop reason: SDUPTHER

## 2023-11-30 RX ORDER — POTASSIUM CHLORIDE 750 MG/1
40 CAPSULE, EXTENDED RELEASE ORAL ONCE
Status: COMPLETED | OUTPATIENT
Start: 2023-11-30 | End: 2023-11-30

## 2023-11-30 RX ORDER — MAGNESIUM SULFATE 1 G/100ML
1 INJECTION INTRAVENOUS
Qty: 200 ML | Refills: 0 | Status: COMPLETED | OUTPATIENT
Start: 2023-11-30 | End: 2023-11-30

## 2023-11-30 RX ORDER — DEXAMETHASONE 6 MG/1
6 TABLET ORAL DAILY
Qty: 8 TABLET | Refills: 0 | Status: SHIPPED | OUTPATIENT
Start: 2023-11-30 | End: 2023-11-30 | Stop reason: SDUPTHER

## 2023-11-30 RX ORDER — CEFDINIR 300 MG/1
300 CAPSULE ORAL EVERY 12 HOURS SCHEDULED
Status: DISCONTINUED | OUTPATIENT
Start: 2023-11-30 | End: 2023-11-30 | Stop reason: HOSPADM

## 2023-11-30 RX ORDER — CEFDINIR 300 MG/1
300 CAPSULE ORAL EVERY 12 HOURS SCHEDULED
Qty: 14 CAPSULE | Refills: 0 | Status: SHIPPED | OUTPATIENT
Start: 2023-11-30 | End: 2023-11-30 | Stop reason: SDUPTHER

## 2023-11-30 RX ORDER — ENOXAPARIN SODIUM 100 MG/ML
40 INJECTION SUBCUTANEOUS DAILY
Status: DISCONTINUED | OUTPATIENT
Start: 2023-11-30 | End: 2023-11-30 | Stop reason: HOSPADM

## 2023-11-30 RX ORDER — DEXAMETHASONE 6 MG/1
6 TABLET ORAL DAILY
Qty: 8 TABLET | Refills: 0 | Status: SHIPPED | OUTPATIENT
Start: 2023-11-30 | End: 2023-12-08

## 2023-11-30 RX ORDER — BUDESONIDE AND FORMOTEROL FUMARATE DIHYDRATE 160; 4.5 UG/1; UG/1
2 AEROSOL RESPIRATORY (INHALATION)
Qty: 2 EACH | Refills: 0 | Status: SHIPPED | OUTPATIENT
Start: 2023-11-30

## 2023-11-30 RX ADMIN — ACETAMINOPHEN 650 MG: 325 TABLET ORAL at 04:06

## 2023-11-30 RX ADMIN — Medication 10 ML: at 10:15

## 2023-11-30 RX ADMIN — PIPERACILLIN AND TAZOBACTAM 3.38 G: 3; .375 INJECTION, POWDER, LYOPHILIZED, FOR SOLUTION INTRAVENOUS at 06:33

## 2023-11-30 RX ADMIN — DOCUSATE SODIUM 50MG AND SENNOSIDES 8.6MG 2 TABLET: 8.6; 5 TABLET, FILM COATED ORAL at 10:14

## 2023-11-30 RX ADMIN — MAGNESIUM SULFATE 1 G: 1 INJECTION INTRAVENOUS at 10:10

## 2023-11-30 RX ADMIN — HEPARIN SODIUM 5000 UNITS: 5000 INJECTION INTRAVENOUS; SUBCUTANEOUS at 05:52

## 2023-11-30 RX ADMIN — DEXAMETHASONE SODIUM PHOSPHATE 6 MG: 10 INJECTION INTRAMUSCULAR; INTRAVENOUS at 10:12

## 2023-11-30 RX ADMIN — MAGNESIUM SULFATE 1 G: 1 INJECTION INTRAVENOUS at 10:14

## 2023-11-30 RX ADMIN — ENOXAPARIN SODIUM 40 MG: 100 INJECTION SUBCUTANEOUS at 10:11

## 2023-11-30 RX ADMIN — POTASSIUM CHLORIDE 40 MEQ: 10 CAPSULE, COATED, EXTENDED RELEASE ORAL at 10:11

## 2023-11-30 RX ADMIN — BUDESONIDE AND FORMOTEROL FUMARATE DIHYDRATE 2 PUFF: 160; 4.5 AEROSOL RESPIRATORY (INHALATION) at 07:04

## 2023-11-30 RX ADMIN — CEFDINIR 300 MG: 300 CAPSULE ORAL at 10:13

## 2023-11-30 NOTE — NURSING NOTE
Patient is discharged as ordered by MD, PIV removed with canula intact, no s/s of phlebitis noted,Patient tolerated procedure well, Patient verbalizes understanding discharge instructions, no acute distress noted no c/o voiced. Patient left the facility via wheelchair to private vehicle for home acompanied by her neighbor

## 2023-11-30 NOTE — DISCHARGE SUMMARY
Physician Discharge Summary    Patient Identification  PATIENT IDENTIFICATION    Name: Dorie Head  :  1950  MRN: [id number]    Admit date: 2023    Discharge date: 2023     Admitting Physician: Sergio Pineda MD     Discharge Physician: Sergio Pineda MD     Admission Diagnoses: Hypoxia [R09.02]  COVID-19 [U07.1]    Hospital Problems:   Principal Problem:  Hypoxia   Active Problems:  Problems Addressed this Visit          Pulmonary and Pneumonias    * (Principal) Hypoxia       Other    COVID-19 - Primary    Relevant Orders    Ambulatory Referral to Pulmonary/COPD Clinic     Diagnoses         Codes Comments    COVID-19    -  Primary ICD-10-CM: U07.1  ICD-9-CM: 079.89     Hypoxia     ICD-10-CM: R09.02  ICD-9-CM: 799.02              Discharged Condition: stable    Consults: IP CONSULT TO HOSPITALIST  IP CONSULT TO PULMONOLOGY  IP CONSULT TO CASE MANAGEMENT     Imaging:   Imaging Results (Last 24 Hours)       Procedure Component Value Units Date/Time    CT Chest Without Contrast Diagnostic [[id number]] Collected: 23     Updated: 23    Narrative:      PROCEDURE: CT CHEST WO CONTRAST DIAGNOSTIC     COMPARISON: [facility], CR, XR CHEST 1 VW, 2023, 6:07.  [facility], CT, CT ABDOMEN PELVIS WO CONTRAST, 2022, 17:40.     INDICATIONS: Short of air, weakness     PROTOCOL:   Standard imaging protocol performed      RADIATION:   DLP: 54mGy*cm    Automated exposure control was utilized to minimize radiation dose.      TECHNIQUE: Axial images of the chest without intravenous contrast.     FINDINGS:  Severe emphysema is present.  Airspace opacity is present in the dependent lower lobes.  No   evidence of pneumothorax.  No evidence of significant pleural or pericardial effusion.  The   thoracic aorta has a normal caliber.  Atherosclerotic disease is present.  Coronary artery   calcification is present.  There is no  mediastinal, axillary or hilar adenopathy.  Degenerative   changes are present in the thoracic spine.  Images of the unenhanced upper abdomen are   unremarkable.     IMPRESSION:      1. Severe emphysema.  Consider annual low-dose screening CT scan of the chest.     2. Airspace consolidation in the dependent lower lobe suspicious for pneumonia.  Endobronchial   secretion is present.  Suggest correlation with any history of aspiration.  Recommend a follow-up   exam after treatment to confirm resolution.     MARILU RAMSEY MD         Electronically Signed and Approved By: MARILU RAMSEY MD on 11/29/2023 at 18:30                             Labs:   Lab Results (last 24 hours)       Procedure Component Value Units Date/Time    Comprehensive Metabolic Panel [[id number]]  (Abnormal) Collected: 11/30/23 0508    Specimen: Blood Updated: 11/30/23 0651     Glucose 188 mg/dL      BUN 27 mg/dL      Creatinine 0.93 mg/dL      Sodium 134 mmol/L      Potassium 3.9 mmol/L      Chloride 101 mmol/L      CO2 20.3 mmol/L      Calcium 9.2 mg/dL      Total Protein 6.5 g/dL      Albumin 3.0 g/dL      ALT (SGPT) <5 U/L      AST (SGOT) 9 U/L      Alkaline Phosphatase 67 U/L      Total Bilirubin 0.4 mg/dL      Globulin 3.5 gm/dL      A/G Ratio 0.9 g/dL      BUN/Creatinine Ratio 29.0     Anion Gap 12.7 mmol/L      eGFR 65.8 mL/min/1.73     Narrative:      GFR Normal >60  Chronic Kidney Disease <60  Kidney Failure <15    The GFR formula is only valid for adults with stable renal function between ages 18 and 70.    Magnesium [[id number]]  (Normal) Collected: 11/30/23 0508    Specimen: Blood Updated: 11/30/23 0617     Magnesium 1.9 mg/dL     Phosphorus [[id number]]  (Normal) Collected: 11/30/23 0508    Specimen: Blood Updated: 11/30/23 0614     Phosphorus 3.8 mg/dL     CBC & Differential [[id number]]  (Abnormal) Collected: 11/30/23 0508    Specimen: Blood Updated: 11/30/23 0546    Narrative:      The following orders were created for panel order  CBC & Differential.  Procedure                               Abnormality         Status                     ---------                               -----------         ------                     CBC Auto Differential[[id number]]        Abnormal            Final result                 Please view results for these tests on the individual orders.    CBC Auto Differential [[id number]]  (Abnormal) Collected: 11/30/23 0508    Specimen: Blood Updated: 11/30/23 0546     WBC 5.85 10*3/mm3      RBC 3.64 10*6/mm3      Hemoglobin 11.5 g/dL      Hematocrit 34.4 %      MCV 94.5 fL      MCH 31.6 pg      MCHC 33.4 g/dL      RDW 11.7 %      RDW-SD 40.7 fl      MPV 10.6 fL      Platelets 236 10*3/mm3      Neutrophil % 84.8 %      Lymphocyte % 9.7 %      Monocyte % 4.6 %      Eosinophil % 0.0 %      Basophil % 0.2 %      Immature Grans % 0.7 %      Neutrophils, Absolute 4.96 10*3/mm3      Lymphocytes, Absolute 0.57 10*3/mm3      Monocytes, Absolute 0.27 10*3/mm3      Eosinophils, Absolute 0.00 10*3/mm3      Basophils, Absolute 0.01 10*3/mm3      Immature Grans, Absolute 0.04 10*3/mm3      nRBC 0.0 /100 WBC     Lactic Acid, Plasma [[id number]]  (Normal) Collected: 11/29/23 1620    Specimen: Blood from Arm, Left Updated: 11/29/23 1646     Lactate 1.3 mmol/L     Blood Culture - Blood, Arm, Right [[id number]] Collected: 11/29/23 1624    Specimen: Blood from Arm, Right Updated: 11/29/23 1630    Blood Culture - Blood, Arm, Left [[id number]] Collected: 11/29/23 1620    Specimen: Blood from Arm, Left Updated: 11/29/23 1629    S. Pneumo Ag Urine or CSF - Urine, Urine, Clean Catch [[id number]]  (Abnormal) Collected: 11/29/23 0827    Specimen: Urine, Clean Catch Updated: 11/29/23 1559     Strep Pneumo Ag Positive    Legionella Antigen, Urine - Urine, Urine, Clean Catch [[id number]]  (Normal) Collected: 11/29/23 0827    Specimen: Urine, Clean Catch Updated: 11/29/23 1559     LEGIONELLA ANTIGEN, URINE Negative    Procalcitonin [[id number]]  (Normal)  "Collected: 11/29/23 0538    Specimen: Blood from Arm, Right Updated: 11/29/23 1554     Procalcitonin 0.16 ng/mL     Narrative:      As a Marker for Sepsis (Non-Neonates):    1. <0.5 ng/mL represents a low risk of severe sepsis and/or septic shock.  2. >2 ng/mL represents a high risk of severe sepsis and/or septic shock.    As a Marker for Lower Respiratory Tract Infections that require antibiotic therapy:    PCT on Admission    Antibiotic Therapy       6-12 Hrs later    >0.5                Strongly Recommended  >0.25 - <0.5        Recommended  0.1 - 0.25          Discouraged              Remeasure/reassess PCT  <0.1                Strongly Discouraged     Remeasure/reassess PCT    As 28 day mortality risk marker: \"Change in Procalcitonin Result\" (>80% or <=80%) if Day 0 (or Day 1) and Day 4 values are available. Refer to http://www.[employer]-pct-calculator.com    Change in PCT <=80%  A decrease of PCT levels below or equal to 80% defines a positive change in PCT test result representing a higher risk for 28-day all-cause mortality of patients diagnosed with severe sepsis for septic shock.    Change in PCT >80%  A decrease of PCT levels of more than 80% defines a negative change in PCT result representing a lower risk for 28-day all-cause mortality of patients diagnosed with severe sepsis or septic shock.    This test is Prognostic not Diagnostic, if elevated correlate with clinical findings before administering antibiotic treatment.        BNP [[id number]]  (Normal) Collected: 11/29/23 0538    Specimen: Blood from Arm, Right Updated: 11/29/23 0953     proBNP 152.4 pg/mL     Narrative:      This assay is used as an aid in the diagnosis of individuals suspected of having heart failure. It can be used as an aid in the diagnosis of acute decompensated heart failure (ADHF) in patients presenting with signs and symptoms of ADHF to the emergency department (ED). In addition, NT-proBNP of <300 pg/mL indicates ADHF is not " likely.    Age Range Result Interpretation  NT-proBNP Concentration (pg/mL:      <50             Positive            >450                   Gray                 300-450                    Negative             <300    50-75           Positive            >900                  Gray                300-900                  Negative            <300      >75             Positive            >1800                  Gray                300-1800                  Negative            <300    COVID-19, FLU A/B, RSV PCR 1 HR TAT - Swab, Nasopharynx [[id number]]  (Abnormal) Collected: 11/29/23 0827    Specimen: Swab from Nasopharynx Updated: 11/29/23 0940     COVID19 Detected     Influenza A PCR Not Detected     Influenza B PCR Not Detected     RSV, PCR Not Detected    Narrative:      Fact sheet for providers: https://www.fda.gov/media/006492/download    Fact sheet for patients: https://www.fda.gov/media/445764/download    Test performed by PCR.              Hospital Course:   73 y.o. female presents to the hospital with weakness and fatigue.  Patient began having general body aches, weakness, fatigue and worsening shortness of breath about 1 week ago.     COVID-19 infection  Strep pneumo pneumonia  AHRF  AECOPD  - requiring 2L O2, no formal diagnosis of COPD but CT chest with severe emphysematous changes and bibasilar infiltrates as well   - strep pneumo urinary antigen positive  - unasyn --> omnicef to complete course  - cont decadron x10 days  - started symbicort and spiriva here  - pulm followed -- will f/u in COPD clinic  - 6MWT at d/c showed need for 2L O2 at home -- arranged    Tobacco abuse  - counseled on cessation  - offered NRT but she declined    Discharge Exam:  Gen: up in bed, in NAD  CV:  RRR, no m/r/g, no peripheral edema  Resp: CTAB, no increase work of breathing  Abd: soft, NT, ND, bs present  Neuro: moves all 4 ext, following commands  Ext: no clubbing, cyanosis or edema  Psych: AAOx3, pleasant  affect    Disposition: Home    Patient Discharge Medications:      Discharge Medications        New Medications        Instructions Start Date   budesonide-formoterol 160-4.5 MCG/ACT inhaler  Commonly known as: SYMBICORT   2 puffs, Inhalation, 2 Times Daily - RT      cefdinir 300 MG capsule  Commonly known as: OMNICEF   300 mg, Oral, Every 12 Hours Scheduled      dexAMETHasone 6 MG tablet  Commonly known as: DECADRON   6 mg, Oral, Daily      tiotropium 18 MCG per inhalation capsule  Commonly known as: Spiriva HandiHaler   1 capsule, Inhalation, Daily - RT             Continue These Medications        Instructions Start Date   acetaminophen 500 MG tablet  Commonly known as: TYLENOL   500 mg, Oral, Every 8 Hours PRN      amLODIPine 10 MG tablet  Commonly known as: NORVASC   10 mg, Oral, Daily      aspirin 81 MG EC tablet   81 mg, Oral, Daily      carvedilol 12.5 MG tablet  Commonly known as: COREG   12.5 mg, Oral, 2 Times Daily With Meals      ezetimibe 10 MG tablet  Commonly known as: ZETIA   10 mg, Oral, Daily      losartan 50 MG tablet  Commonly known as: COZAAR   50 mg, Oral, Daily      ondansetron 8 MG tablet  Commonly known as: ZOFRAN   8 mg, Oral, Every 8 Hours PRN      pravastatin 40 MG tablet  Commonly known as: PRAVACHOL   40 mg, Oral, Daily              Follow-up Information       [facility] PUL[facility]ARE [facility] .    Specialty: Pulmonology  Contact information:  [address] [address] [phone]  Additional information:  PH:  [phone].  The practice is located at [facility] on [facility] (Where [facility] is located)             Nahun Chamorro APRN .    Specialties: Nurse Practitioner, Family Medicine, Forensic  Contact information:  [address] [address]  [phone]                                 Signed:  Jimmie Pineda MD  [facility]ospital[facility]  11/30/2023  09:21 EST      I spent 33 minutes  arranging and coordinating discharge and reviewing medications with majority of time spent counseling patient

## 2023-11-30 NOTE — PLAN OF CARE
Goal Outcome Evaluation:  Plan of Care Reviewed With: patient           Outcome Evaluation: Patient A&Ox4. No acute events overnight. Patient stable on 2L NC. Strict I&O. Increased work of breathing and minor O2 desaturations with ambulation. Patient complaint of overall fatigue and malaise. PRN tylenol administered. No further complaints at this time.

## 2023-11-30 NOTE — PROCEDURES
Respiratory Therapist Walking Oximetry Progress Note      Patient Name:  Dorie Head  YOB: 1950  Date of Procedure: 11/30/23              ROOM AIR BASELINE   SpO2% -91%   Heart Rate 74     EXERCISE ON ROOM AIR SpO2% EXERCISE ON O2 LPM SpO2%   1 MINUTE 91 1 MINUTE 2 91   2 MINUTES 89 2 MINUTES 2 94   3 MINUTES 87 3 MINUTES 2 94   4 MINUTES 86 4 MINUTES     5 MINUTES  5 MINUTES     6 MINUTES  6 MINUTES                SpO2% Post Exercise  95   HR Post Exercise  77   Time to Recovery       Comments:   Patient walked at a slow pace as we had to walk in her room due to covid isolation.  The last few minutes of her test, she did sitting on the edge of the bed while doing some arm and leg movements/exercises.  Patient very hard of hearing and had some difficulty hearing me with N95 mask on but she said she was a little short of breath but felt better after she sat down and rested with her oxygen on.          Electronically signed by Krista Joe RRT, 11/30/23, 2:01 PM EST.

## 2023-11-30 NOTE — PROGRESS NOTES
Pulmonary / Critical Care Progress Note      Patient Name: Dorie Head  : 1950  MRN: [id number]  Attending:  Sergio Pineda, *  Date of admission: 2023    Subjective   Subjective   Follow-up for OPD exacerbation    Over past 24 hours:  Has COVID and pneumococcal pneumonia  On 2 L of oxygen  Feeling better  Shortness of breath better  Cough decreased productive of thick cloudy secretions  No wheezing  Weak and fatigue  No chest pain or hemoptysis  No nausea, fevers or chills    Objective   Objective     Vitals:   Temp:  [97.3 °F (36.3 °C)-97.9 °F (36.6 °C)] 97.7 °F (36.5 °C)  Heart Rate:  [56-78] 60  Resp:  [16-22] 20  BP: (115-149)/(46-68) 140/47  Flow (L/min):  [2-3] 2    Physical Exam   Vital Signs Reviewed   General:  WDWN, Alert, NAD.    HEENT:  PERRL, EOMI.  OP, nares clear  Chest:  good aeration, barrel chested, decreased rhonchi bilaterally, tympanic to percussion bilaterally, no work of breathing noted  CV: RRR, no MGR, pulses 2+, equal.  Abd:  Soft, NT, ND, + BS, no HSM  EXT:  no clubbing, no cyanosis, no edema  Neuro:  A&Ox3, CN grossly intact, no focal deficits.  Skin: No rashes or lesions noted      Result Review    Result Review:  I have personally reviewed the results from the time of this admission to 2023 08:10 EST and agree with these findings:  [x]  Laboratory  [x]  Microbiology  [x]  Radiology  [x]  EKG/Telemetry   []  Cardiology/Vascular   []  Pathology  []  Old records  []  Other:  Most notable findings include:   COVID-positive.  Strep pneumo urine antigen positive        Lab 23  0508 23  0538   WBC 5.85 9.45   HEMOGLOBIN 11.5* 13.0   HEMATOCRIT 34.4 38.4   PLATELETS 236 251   SODIUM 134* 135*   POTASSIUM 3.9 3.2*   CHLORIDE 101 95*   CO2 20.3* 27.1   BUN 27* 21   CREATININE 0.93 1.19*   GLUCOSE 188* 143*   CALCIUM 9.2 9.1   PHOSPHORUS 3.8  --    TOTAL PROTEIN 6.5 7.9   ALBUMIN 3.0* 3.5   GLOBULIN 3.5 4.4     CT Chest Without Contrast  Diagnostic    Result Date: 11/29/2023  PROCEDURE: CT CHEST WO CONTRAST DIAGNOSTIC  COMPARISON: [facility], CR, XR CHEST 1 VW, 11/29/2023, 6:07.  [facility], CT, CT ABDOMEN PELVIS WO CONTRAST, 7/11/2022, 17:40.  INDICATIONS: Short of air, weakness  PROTOCOL:   Standard imaging protocol performed    RADIATION:   DLP: 54mGy*cm   Automated exposure control was utilized to minimize radiation dose.  TECHNIQUE: Axial images of the chest without intravenous contrast.  FINDINGS: Severe emphysema is present.  Airspace opacity is present in the dependent lower lobes.  No evidence of pneumothorax.  No evidence of significant pleural or pericardial effusion.  The thoracic aorta has a normal caliber.  Atherosclerotic disease is present.  Coronary artery calcification is present.  There is no mediastinal, axillary or hilar adenopathy.  Degenerative changes are present in the thoracic spine.  Images of the unenhanced upper abdomen are unremarkable.  IMPRESSION:  1. Severe emphysema.  Consider annual low-dose screening CT scan of the chest.  2. Airspace consolidation in the dependent lower lobe suspicious for pneumonia.  Endobronchial secretion is present.  Suggest correlation with any history of aspiration.  Recommend a follow-up exam after treatment to confirm resolution.  MARILU RAMSEY MD       Electronically Signed and Approved By: MARILU RAMSEY MD on 11/29/2023 at 18:30                Assessment & Plan   Assessment / Plan     Active Hospital Problems:  Active Hospital Problems    Diagnosis     **Hypoxia     COPD exacerbation        Impression:  Acute hypoxemic respiratory failure  COVID-19 lower respiratory tract infection  Pneumococcal pneumonia  Tobacco use of cigarettes in remission  Hyponatremia, clinically insignificant  Hypokalemia     Plan:  Will plan for 10 days of Decadron.  Discharged on oral steroids  Discharge on antibiotics to cover 5 days of treatment for pneumococcal  pneumonia  Continue Symbicort and bronchopulmonary hygiene.  Would discharge on triple inhaler therapy and flutter valve  Chest CT personally reviewed showing of his but this changes and some bibasilar infiltrates consistent with pneumonia  Wean O2 to keep SPO2 greater than 90%  Will need pulmonary follow-up as an outpatient  No indication to trend inflammatory markers at this time  Trend renal panel electrolytes.  Replace magnesium IV and potassium orally     DVT prophylaxis:  Medical and mechanical DVT prophylaxis orders are present.    CODE STATUS:   Code Status (Patient has no pulse and is not breathing): CPR (Attempt to Resuscitate)  Medical Interventions (Patient has pulse or is breathing): Full Support      Labs, imaging, microbiology, notes and medications personally reviewed  Discussed with primary    Electronically signed by Duane Toney MD, 11/30/23, 12:10 PM EST.

## 2023-11-30 NOTE — PROGRESS NOTES
"DAILY PROGRESS NOTE  [facility]      PATIENT IDENTIFICATION    Name: Dorie Head  :  1950  MRN: [id number]    CHIEF COMPLAINT/PRINCIPAL DIAGNOSIS: Hypoxia       SUBJECTIVE    Feels better. Sob with exertion. Not much cough. No fevers    ROS:   Gen: No fever or chills  CV: no chest pain or palpitation  Resp: + shortness of breath no  cough  GI: no nausea, vomiting, diarrhea  Neuro: no headache or dizziness     OBJECTIVE     Exam:  /56 (BP Location: Right arm, Patient Position: Lying)   Pulse 61   Temp 97.3 °F (36.3 °C) (Oral)   Resp 20   Ht 175.3 cm (69\")   Wt 58 kg (127 lb 13.9 oz)   SpO2 94%   BMI 18.88 kg/m²   Intake/Output last 24 hours:    Intake/Output Summary (Last 24 hours) at 2023 0907  Last data filed at 2023 0500  Gross per 24 hour   Intake 1060 ml   Output 600 ml   Net 460 ml        Gen: NAD, up in bed  Resp: CTAB, no increased work of breathing  CV: RRR, no m/r/g. No peripheral edema  GI: Soft, nontender, (+) BS. nondistended  Psych: Alert and Oriented x 3, Mood and affect appropriate to situation  Skin: warm and dry on palpation. No rash on inspection.  Neuro: moves all 4 extremities, follows simple commands    DATA REVIEW:  Lab Results (last 24 hours)       Procedure Component Value Units Date/Time    Comprehensive Metabolic Panel [[id number]]  (Abnormal) Collected: 23 050    Specimen: Blood Updated: 23     Glucose 188 mg/dL      BUN 27 mg/dL      Creatinine 0.93 mg/dL      Sodium 134 mmol/L      Potassium 3.9 mmol/L      Chloride 101 mmol/L      CO2 20.3 mmol/L      Calcium 9.2 mg/dL      Total Protein 6.5 g/dL      Albumin 3.0 g/dL      ALT (SGPT) <5 U/L      AST (SGOT) 9 U/L      Alkaline Phosphatase 67 U/L      Total Bilirubin 0.4 mg/dL      Globulin 3.5 gm/dL      A/G Ratio 0.9 g/dL      BUN/Creatinine Ratio 29.0     Anion Gap 12.7 mmol/L      eGFR 65.8 mL/min/1.73     Narrative:      GFR Normal >60  Chronic Kidney Disease <60  Kidney " Failure <15    The GFR formula is only valid for adults with stable renal function between ages 18 and 70.    Magnesium [[id number]]  (Normal) Collected: 11/30/23 0508    Specimen: Blood Updated: 11/30/23 0617     Magnesium 1.9 mg/dL     Phosphorus [[id number]]  (Normal) Collected: 11/30/23 0508    Specimen: Blood Updated: 11/30/23 0614     Phosphorus 3.8 mg/dL     CBC & Differential [[id number]]  (Abnormal) Collected: 11/30/23 0508    Specimen: Blood Updated: 11/30/23 0546    Narrative:      The following orders were created for panel order CBC & Differential.  Procedure                               Abnormality         Status                     ---------                               -----------         ------                     CBC Auto Differential[[id number]]        Abnormal            Final result                 Please view results for these tests on the individual orders.    CBC Auto Differential [[id number]]  (Abnormal) Collected: 11/30/23 0508    Specimen: Blood Updated: 11/30/23 0546     WBC 5.85 10*3/mm3      RBC 3.64 10*6/mm3      Hemoglobin 11.5 g/dL      Hematocrit 34.4 %      MCV 94.5 fL      MCH 31.6 pg      MCHC 33.4 g/dL      RDW 11.7 %      RDW-SD 40.7 fl      MPV 10.6 fL      Platelets 236 10*3/mm3      Neutrophil % 84.8 %      Lymphocyte % 9.7 %      Monocyte % 4.6 %      Eosinophil % 0.0 %      Basophil % 0.2 %      Immature Grans % 0.7 %      Neutrophils, Absolute 4.96 10*3/mm3      Lymphocytes, Absolute 0.57 10*3/mm3      Monocytes, Absolute 0.27 10*3/mm3      Eosinophils, Absolute 0.00 10*3/mm3      Basophils, Absolute 0.01 10*3/mm3      Immature Grans, Absolute 0.04 10*3/mm3      nRBC 0.0 /100 WBC     Lactic Acid, Plasma [[id number]]  (Normal) Collected: 11/29/23 1620    Specimen: Blood from Arm, Left Updated: 11/29/23 1646     Lactate 1.3 mmol/L     Blood Culture - Blood, Arm, Right [[id number]] Collected: 11/29/23 1624    Specimen: Blood from Arm, Right Updated: 11/29/23 1630    Blood Culture  "- Blood, Arm, Left [[id number]] Collected: 11/29/23 1620    Specimen: Blood from Arm, Left Updated: 11/29/23 1629    S. Pneumo Ag Urine or CSF - Urine, Urine, Clean Catch [[id number]]  (Abnormal) Collected: 11/29/23 0827    Specimen: Urine, Clean Catch Updated: 11/29/23 1559     Strep Pneumo Ag Positive    Legionella Antigen, Urine - Urine, Urine, Clean Catch [[id number]]  (Normal) Collected: 11/29/23 0827    Specimen: Urine, Clean Catch Updated: 11/29/23 1559     LEGIONELLA ANTIGEN, URINE Negative    Procalcitonin [[id number]]  (Normal) Collected: 11/29/23 0538    Specimen: Blood from Arm, Right Updated: 11/29/23 1554     Procalcitonin 0.16 ng/mL     Narrative:      As a Marker for Sepsis (Non-Neonates):    1. <0.5 ng/mL represents a low risk of severe sepsis and/or septic shock.  2. >2 ng/mL represents a high risk of severe sepsis and/or septic shock.    As a Marker for Lower Respiratory Tract Infections that require antibiotic therapy:    PCT on Admission    Antibiotic Therapy       6-12 Hrs later    >0.5                Strongly Recommended  >0.25 - <0.5        Recommended  0.1 - 0.25          Discouraged              Remeasure/reassess PCT  <0.1                Strongly Discouraged     Remeasure/reassess PCT    As 28 day mortality risk marker: \"Change in Procalcitonin Result\" (>80% or <=80%) if Day 0 (or Day 1) and Day 4 values are available. Refer to http://www.[facility]-pct-calculator.com    Change in PCT <=80%  A decrease of PCT levels below or equal to 80% defines a positive change in PCT test result representing a higher risk for 28-day all-cause mortality of patients diagnosed with severe sepsis for septic shock.    Change in PCT >80%  A decrease of PCT levels of more than 80% defines a negative change in PCT result representing a lower risk for 28-day all-cause mortality of patients diagnosed with severe sepsis or septic shock.    This test is Prognostic not Diagnostic, if elevated correlate with clinical " findings before administering antibiotic treatment.        BNP [[id number]]  (Normal) Collected: 11/29/23 0538    Specimen: Blood from Arm, Right Updated: 11/29/23 0953     proBNP 152.4 pg/mL     Narrative:      This assay is used as an aid in the diagnosis of individuals suspected of having heart failure. It can be used as an aid in the diagnosis of acute decompensated heart failure (ADHF) in patients presenting with signs and symptoms of ADHF to the emergency department (ED). In addition, NT-proBNP of <300 pg/mL indicates ADHF is not likely.    Age Range Result Interpretation  NT-proBNP Concentration (pg/mL:      <50             Positive            >450                   Gray                 300-450                    Negative             <300    50-75           Positive            >900                  Gray                300-900                  Negative            <300      >75             Positive            >1800                  Gray                300-1800                  Negative            <300    COVID-19, FLU A/B, RSV PCR 1 HR TAT - Swab, Nasopharynx [[id number]]  (Abnormal) Collected: 11/29/23 0827    Specimen: Swab from Nasopharynx Updated: 11/29/23 0940     COVID19 Detected     Influenza A PCR Not Detected     Influenza B PCR Not Detected     RSV, PCR Not Detected    Narrative:      Fact sheet for providers: https://www.fda.gov/media/757609/download    Fact sheet for patients: https://www.fda.gov/media/922715/download    Test performed by PCR.    Urine Culture - Urine, Urine, Clean Catch [[id number]] Collected: 11/29/23 0827    Specimen: Urine, Clean Catch Updated: 11/29/23 0910            Imaging Results (Last 24 Hours)       Procedure Component Value Units Date/Time    CT Chest Without Contrast Diagnostic [[id number]] Collected: 11/29/23 1829     Updated: 11/29/23 1833    Narrative:      PROCEDURE: CT CHEST WO CONTRAST DIAGNOSTIC     COMPARISON: [facility], CR, XR CHEST 1 VW, 11/29/2023,  6:07.  [facility], CT, CT ABDOMEN PELVIS WO CONTRAST, 7/11/2022, 17:40.     INDICATIONS: Short of air, weakness     PROTOCOL:   Standard imaging protocol performed      RADIATION:   DLP: 54mGy*cm    Automated exposure control was utilized to minimize radiation dose.      TECHNIQUE: Axial images of the chest without intravenous contrast.     FINDINGS:  Severe emphysema is present.  Airspace opacity is present in the dependent lower lobes.  No   evidence of pneumothorax.  No evidence of significant pleural or pericardial effusion.  The   thoracic aorta has a normal caliber.  Atherosclerotic disease is present.  Coronary artery   calcification is present.  There is no mediastinal, axillary or hilar adenopathy.  Degenerative   changes are present in the thoracic spine.  Images of the unenhanced upper abdomen are   unremarkable.     IMPRESSION:      1. Severe emphysema.  Consider annual low-dose screening CT scan of the chest.     2. Airspace consolidation in the dependent lower lobe suspicious for pneumonia.  Endobronchial   secretion is present.  Suggest correlation with any history of aspiration.  Recommend a follow-up   exam after treatment to confirm resolution.     MARILU RAMSEY MD         Electronically Signed and Approved By: MARILU RAMSEY MD on 11/29/2023 at 18:30                             Labs and imaging noted above have been personally reviewed by me.    Scheduled Meds:budesonide-formoterol, 2 puff, Inhalation, BID - RT  cefdinir, 300 mg, Oral, Q12H  dexAMETHasone, 6 mg, Oral, Daily   Or  dexAMETHasone, 6 mg, Intravenous, Daily  heparin (porcine), 5,000 Units, Subcutaneous, Q8H  magnesium sulfate in D5W 1g/100mL (PREMIX), 1 g, Intravenous, Q1H  nicotine, 1 patch, Transdermal, Q24H  potassium chloride, 40 mEq, Oral, Once  senna-docusate sodium, 2 tablet, Oral, BID  sodium chloride, 10 mL, Intravenous, Q12H      Continuous Infusions:   PRN Meds:  acetaminophen **OR** acetaminophen **OR**  acetaminophen    senna-docusate sodium **AND** polyethylene glycol **AND** bisacodyl **AND** bisacodyl    guaiFENesin-dextromethorphan    ipratropium-albuterol    melatonin    nicotine polacrilex    ondansetron    sodium chloride    sodium chloride    sodium chloride    ASSESSMENT/PLAN      Hypoxia    COPD exacerbation    COVID-19 infection  Strep pneumo pneumonia  AHRF  AECOPD  - requiring 2L O2  - CT Chest with bibasilar infiltrates -- strep pneumo urinary antigen positive  - will switch unasyn to omnicef to complete course  - cont decadron x10 days  - cont inhalers  - pulm following    Nutrition - Diet: Cardiac Diets, Diabetic Diets; Healthy Heart (2-3 Na+); Consistent Carbohydrate; Texture: Regular Texture (IDDSI 7); Fluid Consistency: Thin (IDDSI 0)   DVT Prophylaxis - lovenox  Code Status - full   GI ppx - none  Disposition - home today       Jimmie Pineda MD  [facility]ospitalist Group  11/30/2023  09:07 EST

## 2023-11-30 NOTE — OUTREACH NOTE
Prep Survey      Flowsheet Row Responses   [facility] patient discharged from? [facility]   Is LACE score < 7 ? Yes   Eligibility [facility]   Date of Admission 11/29/23   Date of Discharge 11/30/23   Discharge Disposition Home or Self Care   Discharge diagnosis COPD exacerbation   Does the patient have one of the following disease processes/diagnoses(primary or secondary)? COPD   Does the patient have Home health ordered? No   Is there a DME ordered? No   Prep survey completed? Yes            REBEKA SHEARER - Registered Nurse

## 2023-12-01 ENCOUNTER — TRANSITIONAL CARE MANAGEMENT TELEPHONE ENCOUNTER (OUTPATIENT)
Dept: CALL CENTER | Facility: HOSPITAL | Age: 73
End: 2023-12-01
Payer: COMMERCIAL

## 2023-12-01 NOTE — OUTREACH NOTE
Call Center TCM Note      Flowsheet Row Responses   [facility] patient discharged from? [facility]   Does the patient have one of the following disease processes/diagnoses(primary or secondary)? COPD   TCM attempt successful? Yes  [no updated verbal release]   Call start time [phone]   Call end time 1109   Discharge diagnosis COPD exacerbation/covid   Meds reviewed with patient/caregiver? Yes   Is the patient having any side effects they believe may be caused by any medication additions or changes? No   Does the patient have all medications ordered at discharge? No   What is keeping the patient from filling the prescriptions? --  [Pharmacy does not have any medications that are ordered per pt]   Nursing Interventions Nurse provided patient education, Nurse called pharmacy  [This RN called pharmacy--there was an issue with symbicort which caused all other medications to appear as not available, corrected and medications will be available for pick-up.]   Comments Pulmonary f/u on 12/7/23--Pt will call PCP after her Pulmonary appt   Does the patient have an appointment with their PCP within 7-14 days of discharge? No   Nursing Interventions Patient desires to follow up with specialty only   Has home health visited the patient within 72 hours of discharge? N/A   Has all DME been delivered? No   DME interventions Called DME agency   DME comments Called [facility] as pt has not received her O2 concentrator yet and to inform that she needs smaller nasal prongs as she is not able to wear large prongs due to irritation--she is holding her O2 up to her nose when needed.Pt is set up for delivery today and should have 2 different size nasal prongs to choose from.   Pulse Ox monitoring None  [Pt has ordered a pulse ox]   Psychosocial issues? No   Did the patient receive a copy of their discharge instructions? Yes   Nursing interventions Reviewed instructions with patient   What is the patient's perception of their health status  since discharge? Improving  [Pt is tired and hasn't slept in days she reports, using O2 when needed but having issues with tolerating as noted.  Reviewed return s/s with pt and worked with pharmacy regarding her medications.]   Nursing Interventions Nurse provided patient education   Are the patient's immunizations up to date?  No   Is the patient/caregiver able to teach back the hierarchy of who to call/visit for symptoms/problems? PCP, Specialist, Home health nurse, Urgent Care, ED, 911 Yes   Is the patient/caregiver able to teach back signs and symptoms of worsening condition: Fever/chills, Shortness of breath, Chest pain   Is the patient/caregiver able to teach back importance of completing antibiotic course of treatment? Yes   TCM call completed? Yes   Call end time 1109            Crystal Gaviria RN    12/1/2023, 11:23 EST

## 2023-12-02 LAB
BACTERIA SPEC RESP CULT: NORMAL
GRAM STN SPEC: NORMAL

## 2023-12-04 LAB
BACTERIA SPEC AEROBE CULT: NORMAL
BACTERIA SPEC AEROBE CULT: NORMAL

## 2023-12-05 NOTE — PROGRESS NOTES
Primary Care Provider  Nahun Chamorro APRN   Referring Provider  Sergio Pineda, *      Patient Complaint  Hospital Follow Up Visit (Covid)      Subjective          Dorie Head presents to [facility]      History of Presenting Illness  Dorie Head is a 73 y.o. female with history of recent COVID-19, recent pneumonia, emphysema, and tobacco abuse in remission, here for hospital follow-up.    Patient was hospitalized at [facility] overnight 11/29/2023 through 11/30/2023 for acute hypoxic respiratory failure, COVID-19 infection, and pneumococcal pneumonia.  Patient presented to the ED with weakness, body aches, and low-grade fever for several days.  Her COVID-19 swab in the ED was positive.  She did not normally wear oxygen at home.  Chest CT showed consolidation in the lower lobes, likely pneumonia.  There was also emphysematous changes.  Pulmonology was consulted, patient started on antibiotics and IV steroids, breathing treatments, and supplemental oxygen.  Patient began to improve clinically, stable for discharge home with instructions to complete oral antibiotic and prednisone.  She was prescribed supplemental oxygen as well.  She was instructed to follow-up in the outpatient pulmonology clinic.    Patient presents to COPD clinic today, reports she is doing well since being discharged from the hospital from a respiratory standpoint.  She is new to our outpatient clinic.  She is experiencing a flareup of her chronic colitis with difficulty controlling her bowels.  This may be due to her having COVID recently.  Patient's blood pressure was elevated today, attributes this to anxiety.  She continues to take Decadron as well which may be contributing to her increased blood pressure.  She is also still taking the cefdinir that was prescribed at discharge.  Patient denies any fevers or chills.  The cough she experienced prior to hospitalization has  resolved.  She reports no shortness of breath, even with exertion.  She has been using Symbicort daily since discharge; she was also prescribed Spiriva but was too expensive.  She does not have a rescue inhaler.  Patient has 2 L supplemental oxygen to use as needed at home, which is new for her, but she has not needed it.  Patient is a former smoker, quit almost 2 years ago, 20 pack years.  Patient denies any productive cough, hemoptysis, swollen lymph nodes, weight loss, or night sweats.  Overall, patient is doing well and has no additional concerns at this time.  Patient is able to perform ADLs without difficulty.  I have personally reviewed the review of systems, past family, social, medical and surgical histories; and agree with their findings.      Review of Systems    Review of Systems   Constitutional:  Negative for activity change, chills, fatigue, fever, unexpected weight gain and unexpected weight loss.   HENT:  Negative for congestion, ear discharge, ear pain, mouth sores, postnasal drip, rhinorrhea, sinus pressure, sore throat, swollen glands and trouble swallowing.    Eyes:  Negative for blurred vision, pain, discharge, itching and visual disturbance.   Respiratory:  Negative for apnea, cough, chest tightness, shortness of breath, wheezing and stridor.    Cardiovascular:  Negative for chest pain, palpitations and leg swelling.   Gastrointestinal:  Negative for abdominal distention, abdominal pain, constipation, diarrhea, nausea, vomiting, GERD and indigestion.        Patient states she is having a colitis flare, difficulty controlling bowels   Musculoskeletal:  Negative for arthralgias, joint swelling and myalgias.   Skin:  Negative for color change.   Neurological:  Negative for dizziness, weakness, light-headedness and headache.      Sleep: Negative for Excessive daytime sleepiness  Negative for morning headaches  Negative for Snoring      Family History   Problem Relation Age of Onset    Diabetes  Mother     Alcohol abuse Mother     Ovarian cancer Sister     Cancer Sister         ovarian cancer    Alcohol abuse Father     Heart disease Father     Hyperlipidemia Father     Alcohol abuse Brother     Drug abuse Brother         Social History     Socioeconomic History    Marital status: Single   Tobacco Use    Smoking status: Former     Packs/day: .5     Types: Cigarettes     Start date: 1983     Quit date: 2022     Years since quittin.8    Smokeless tobacco: Never    Tobacco comments:     started smoking at age 4; smoked 10 ciagarette (s) per day   Vaping Use    Vaping Use: Never used   Substance and Sexual Activity    Alcohol use: Never    Drug use: Never    Sexual activity: Not Currently     Partners: Male     Birth control/protection: Tubal ligation, Birth control pill, Hysterectomy        Past Medical History:   Diagnosis Date    Arthritis 2022    R.A.    Basal cell carcinoma     Cancer     Cancer     squamos cell carcinoma    COPD (chronic obstructive pulmonary disease)     Coronary artery disease     Diverticulosis can't remember    Heart murmur can't remember    mitral valve regurgitation    High blood pressure     High cholesterol     HL (hearing loss)     pretty bad    Inflammatory bowel disease can't remember    microscopic colitis    Scoliosis     was diagnoised while I was serving in the [employer]    Visual impairment     wear glasses        Immunization History   Administered Date(s) Administered    COVID-19 (MODERNA) 1st,2nd,3rd Dose Monovalent 2021, 2021, 2021    Flu Vaccine Quad PF >36MO 2021    Fluzone (or Fluarix & Flulaval for VFC) >6mos 2021    Fluzone High Dose =>65 Years ([employer] ONLY) 2017, 09/15/2020    Fluzone High-Dose 65+yrs 09/15/2020    Pneumococcal Conjugate 13-Valent (PCV13) 2017       No Known Allergies       Current Outpatient Medications:     acetaminophen (TYLENOL) 500 MG tablet, Take 1 tablet by mouth  "Every 8 (Eight) Hours As Needed for Mild Pain., Disp: , Rfl:     amLODIPine (NORVASC) 10 MG tablet, Take 1 tablet by mouth Daily., Disp: 90 tablet, Rfl: 3    aspirin 81 MG EC tablet, Take 1 tablet by mouth Daily., Disp: 90 tablet, Rfl: 1    budesonide-formoterol (SYMBICORT) 160-4.5 MCG/ACT inhaler, Inhale 2 puffs 2 (Two) Times a Day., Disp: 2 each, Rfl: 0    carvedilol (COREG) 12.5 MG tablet, Take 1 tablet by mouth 2 (Two) Times a Day With Meals., Disp: 90 tablet, Rfl: 1    cefdinir (OMNICEF) 300 MG capsule, Take 1 capsule by mouth Every 12 (Twelve) Hours for 14 doses. Indications: Pneumonia, Disp: 14 capsule, Rfl: 0    dexAMETHasone (DECADRON) 6 MG tablet, Take 1 tablet by mouth Daily for 8 doses. Indications: COVID-19 Confirmed Infection, Disp: 8 tablet, Rfl: 0    ezetimibe (ZETIA) 10 MG tablet, Take 1 tablet by mouth Daily., Disp: 90 tablet, Rfl: 3    losartan (COZAAR) 50 MG tablet, Take 1 tablet by mouth Daily., Disp: 90 tablet, Rfl: 3    ondansetron (ZOFRAN) 8 MG tablet, Take 1 tablet by mouth Every 8 (Eight) Hours As Needed for Nausea or Vomiting., Disp: 30 tablet, Rfl: 3    pravastatin (PRAVACHOL) 40 MG tablet, Take 1 tablet by mouth Daily., Disp: 90 tablet, Rfl: 3    albuterol sulfate  (90 Base) MCG/ACT inhaler, Inhale 2 puffs Every 4 (Four) Hours As Needed for Wheezing., Disp: 18 g, Rfl: 5    tiotropium (Spiriva HandiHaler) 18 MCG per inhalation capsule, Place 1 capsule into inhaler and inhale Daily., Disp: 30 capsule, Rfl: 0     Objective     Vital Signs:   /66 (BP Location: Left arm, Patient Position: Sitting, Cuff Size: Adult) Comment: MANUAL  Pulse 63   Temp 97.7 °F (36.5 °C) (Tympanic)   Resp 18   Ht 175.3 cm (69\")   Wt 48.5 kg (107 lb)   SpO2 97% Comment: ROOM AIR? 2L PRN  BMI 15.80 kg/m²     Physical Exam  Constitutional:       General: She is not in acute distress.     Appearance: Normal appearance. She is normal weight. She is not ill-appearing.   HENT:      Right Ear: Tympanic " membrane and ear canal normal.      Left Ear: Tympanic membrane and ear canal normal.      Nose: Nose normal.      Mouth/Throat:      Mouth: Mucous membranes are moist.      Pharynx: Oropharynx is clear.   Eyes:      Extraocular Movements: Extraocular movements intact.      Conjunctiva/sclera: Conjunctivae normal.      Pupils: Pupils are equal, round, and reactive to light.   Cardiovascular:      Rate and Rhythm: Normal rate and regular rhythm.      Pulses: Normal pulses.      Heart sounds: Normal heart sounds.   Pulmonary:      Effort: Pulmonary effort is normal. No respiratory distress.      Breath sounds: No stridor. No wheezing, rhonchi or rales.      Comments: Diminished bilateral lower lobes  Abdominal:      General: Bowel sounds are normal.      Palpations: Abdomen is soft.   Musculoskeletal:         General: No swelling. Normal range of motion.      Cervical back: Normal range of motion and neck supple.      Right lower leg: No edema.      Left lower leg: No edema.   Skin:     General: Skin is warm and dry.   Neurological:      General: No focal deficit present.      Mental Status: She is alert and oriented to person, place, and time.      Motor: No weakness.   Psychiatric:         Mood and Affect: Mood normal.         Behavior: Behavior normal.        Result Review :   I have personally reviewed patient's labs and images.  I also reviewed ED provider note 11/29/2023, Dr. Toney's final progress note 11/30/2023, and Dr. Pineda's discharge summary 11/30/2023.            Diagnoses and all orders for this visit:    1. Acute respiratory failure with hypoxia (Primary)    2. COVID-19 virus infection    3. Pulmonary emphysema, unspecified emphysema type  -     Alpha - 1 - Antitrypsin; Future  -     Complete PFT - Pre & Post Bronchodilator; Future  -     albuterol sulfate  (90 Base) MCG/ACT inhaler; Inhale 2 puffs Every 4 (Four) Hours As Needed for Wheezing.  Dispense: 18 g; Refill: 5    4. History of  pneumonia  -     CT Chest Without Contrast; Future    5. Tobacco abuse, in remission       Impression and Plan    -CT chest 11/29/2023 showed severe emphysema, airspace consolidation of the lower lobe suspicious for pneumonia, endobronchial secretions present.  Suggest correlation with any history of aspiration.  We will order follow-up chest CT to check for resolution of pneumonia, to be done March 2024.  -Alpha-1 antitrypsin genotype and levels ordered today  -PFTs ordered today  -Continue using Symbicort daily as prescribed, reminded patient to rinse mouth after each use  -Patient was prescribed Spiriva at discharge, but co-pay was very expensive.  We can give her samples if available.  -Albuterol inhaler prescribed today for as needed use  -We will prescribe patient an albuterol inhaler to use as needed  -Follow-up in 3 months after repeat chest CT    Smoking status: Reviewed  Vaccination status: Patient reports she is up-to-date with her Covid vaccines, declines flu and pneumonia vaccines.  Patient is advised to continue to follow CDC recommendations such as social distancing wearing a mask and washing hands for at least 20 seconds.  Medications personally reviewed    Follow Up   No follow-ups on file.  Patient was given instructions and counseling regarding her condition or for health maintenance advice. Please see specific information pulled into the AVS if appropriate.

## 2023-12-07 ENCOUNTER — OFFICE VISIT (OUTPATIENT)
Dept: PULMONOLOGY | Facility: CLINIC | Age: 73
End: 2023-12-07
Payer: COMMERCIAL

## 2023-12-07 VITALS
BODY MASS INDEX: 15.85 KG/M2 | RESPIRATION RATE: 18 BRPM | SYSTOLIC BLOOD PRESSURE: 164 MMHG | OXYGEN SATURATION: 97 % | TEMPERATURE: 97.7 F | WEIGHT: 107 LBS | HEART RATE: 63 BPM | HEIGHT: 69 IN | DIASTOLIC BLOOD PRESSURE: 66 MMHG

## 2023-12-07 DIAGNOSIS — U07.1 COVID-19 VIRUS INFECTION: ICD-10-CM

## 2023-12-07 DIAGNOSIS — F17.201 TOBACCO ABUSE, IN REMISSION: ICD-10-CM

## 2023-12-07 DIAGNOSIS — J43.9 PULMONARY EMPHYSEMA, UNSPECIFIED EMPHYSEMA TYPE: ICD-10-CM

## 2023-12-07 DIAGNOSIS — J96.01 ACUTE RESPIRATORY FAILURE WITH HYPOXIA: Primary | ICD-10-CM

## 2023-12-07 DIAGNOSIS — Z87.01 HISTORY OF PNEUMONIA: ICD-10-CM

## 2023-12-07 RX ORDER — ALBUTEROL SULFATE 90 UG/1
2 AEROSOL, METERED RESPIRATORY (INHALATION) EVERY 4 HOURS PRN
Qty: 18 G | Refills: 5 | Status: SHIPPED | OUTPATIENT
Start: 2023-12-07

## 2023-12-12 ENCOUNTER — HOSPITAL ENCOUNTER (OUTPATIENT)
Dept: GENERAL RADIOLOGY | Facility: HOSPITAL | Age: 73
Discharge: HOME | End: 2023-12-12
Payer: COMMERCIAL

## 2023-12-12 ENCOUNTER — LAB (OUTPATIENT)
Dept: LAB | Facility: HOSPITAL | Age: 73
End: 2023-12-12
Payer: COMMERCIAL

## 2023-12-12 ENCOUNTER — OFFICE VISIT (OUTPATIENT)
Dept: FAMILY MEDICINE CLINIC | Facility: CLINIC | Age: 73
End: 2023-12-12
Payer: COMMERCIAL

## 2023-12-12 DIAGNOSIS — R31.9 HEMATURIA, UNSPECIFIED TYPE: ICD-10-CM

## 2023-12-12 DIAGNOSIS — R06.02 SHORTNESS OF BREATH: ICD-10-CM

## 2023-12-12 DIAGNOSIS — R73.01 IMPAIRED FASTING GLUCOSE: ICD-10-CM

## 2023-12-12 DIAGNOSIS — J44.9 CHRONIC OBSTRUCTIVE PULMONARY DISEASE, UNSPECIFIED COPD TYPE: ICD-10-CM

## 2023-12-12 DIAGNOSIS — I10 PRIMARY HYPERTENSION: ICD-10-CM

## 2023-12-12 DIAGNOSIS — E78.5 HYPERLIPIDEMIA, UNSPECIFIED HYPERLIPIDEMIA TYPE: ICD-10-CM

## 2023-12-12 DIAGNOSIS — J43.9 PULMONARY EMPHYSEMA, UNSPECIFIED EMPHYSEMA TYPE: ICD-10-CM

## 2023-12-12 DIAGNOSIS — I10 PRIMARY HYPERTENSION: Primary | ICD-10-CM

## 2023-12-12 DIAGNOSIS — R25.2 MUSCLE CRAMPS: ICD-10-CM

## 2023-12-12 LAB
ALBUMIN SERPL-MCNC: 3.9 G/DL (ref 3.5–5.2)
ALBUMIN/GLOB SERPL: 1.3 G/DL
ALP SERPL-CCNC: 75 U/L (ref 39–117)
ALPHA1 GLOB MFR UR ELPH: 220 MG/DL (ref 90–200)
ALT SERPL W P-5'-P-CCNC: 15 U/L (ref 1–33)
ANION GAP SERPL CALCULATED.3IONS-SCNC: 11.4 MMOL/L (ref 5–15)
AST SERPL-CCNC: 18 U/L (ref 1–32)
BILIRUB BLD-MCNC: NEGATIVE MG/DL
BILIRUB SERPL-MCNC: 0.5 MG/DL (ref 0–1.2)
BUN SERPL-MCNC: 15 MG/DL (ref 8–23)
BUN/CREAT SERPL: 20 (ref 7–25)
CALCIUM SPEC-SCNC: 9.7 MG/DL (ref 8.6–10.5)
CHLORIDE SERPL-SCNC: 98 MMOL/L (ref 98–107)
CHOLEST SERPL-MCNC: 157 MG/DL (ref 0–200)
CLARITY, POC: CLEAR
CO2 SERPL-SCNC: 25.6 MMOL/L (ref 22–29)
COLOR UR: YELLOW
CREAT SERPL-MCNC: 0.75 MG/DL (ref 0.57–1)
DEPRECATED RDW RBC AUTO: 43.3 FL (ref 37–54)
EGFRCR SERPLBLD CKD-EPI 2021: 85.2 ML/MIN/1.73
ERYTHROCYTE [DISTWIDTH] IN BLOOD BY AUTOMATED COUNT: 12.5 % (ref 12.3–15.4)
EXPIRATION DATE: ABNORMAL
GLOBULIN UR ELPH-MCNC: 3.1 GM/DL
GLUCOSE SERPL-MCNC: 87 MG/DL (ref 65–99)
GLUCOSE UR STRIP-MCNC: NEGATIVE MG/DL
HBA1C MFR BLD: 5.6 % (ref 4.8–5.6)
HCT VFR BLD AUTO: 38.2 % (ref 34–46.6)
HDLC SERPL-MCNC: 68 MG/DL (ref 40–60)
HGB BLD-MCNC: 12.7 G/DL (ref 12–15.9)
KETONES UR QL: NEGATIVE
LDLC SERPL CALC-MCNC: 69 MG/DL (ref 0–100)
LDLC/HDLC SERPL: 0.97 {RATIO}
LEUKOCYTE EST, POC: NEGATIVE
Lab: ABNORMAL
MAGNESIUM SERPL-MCNC: 1.9 MG/DL (ref 1.6–2.4)
MCH RBC QN AUTO: 32.2 PG (ref 26.6–33)
MCHC RBC AUTO-ENTMCNC: 33.2 G/DL (ref 31.5–35.7)
MCV RBC AUTO: 97 FL (ref 79–97)
NITRITE UR-MCNC: NEGATIVE MG/ML
PH UR: 6 [PH] (ref 5–8)
PLATELET # BLD AUTO: 374 10*3/MM3 (ref 140–450)
PMV BLD AUTO: 9.7 FL (ref 6–12)
POTASSIUM SERPL-SCNC: 4.3 MMOL/L (ref 3.5–5.2)
PROT SERPL-MCNC: 7 G/DL (ref 6–8.5)
PROT UR STRIP-MCNC: NEGATIVE MG/DL
RBC # BLD AUTO: 3.94 10*6/MM3 (ref 3.77–5.28)
RBC # UR STRIP: ABNORMAL /UL
SODIUM SERPL-SCNC: 135 MMOL/L (ref 136–145)
SP GR UR: 1.01 (ref 1–1.03)
TRIGL SERPL-MCNC: 116 MG/DL (ref 0–150)
UROBILINOGEN UR QL: NORMAL
VLDLC SERPL-MCNC: 20 MG/DL (ref 5–40)
WBC NRBC COR # BLD AUTO: 15.18 10*3/MM3 (ref 3.4–10.8)

## 2023-12-12 PROCEDURE — 71046 X-RAY EXAM CHEST 2 VIEWS: CPT

## 2023-12-12 PROCEDURE — 80053 COMPREHEN METABOLIC PANEL: CPT

## 2023-12-12 PROCEDURE — 80061 LIPID PANEL: CPT

## 2023-12-12 PROCEDURE — 83036 HEMOGLOBIN GLYCOSYLATED A1C: CPT

## 2023-12-12 PROCEDURE — 36415 COLL VENOUS BLD VENIPUNCTURE: CPT

## 2023-12-12 PROCEDURE — 84207 ASSAY OF VITAMIN B-6: CPT

## 2023-12-12 PROCEDURE — 83735 ASSAY OF MAGNESIUM: CPT

## 2023-12-12 PROCEDURE — 82103 ALPHA-1-ANTITRYPSIN TOTAL: CPT

## 2023-12-12 PROCEDURE — 85027 COMPLETE CBC AUTOMATED: CPT

## 2023-12-12 NOTE — PROGRESS NOTES
Transitional Care Follow Up Visit  Subjective     Dorie HUYNH Sonido is a 73 y.o. female who presents for a transitional care management visit.    Within 48 business hours after discharge our office contacted her via telephone to coordinate her care and needs.      I reviewed and discussed the details of that call along with the discharge summary, hospital problems, inpatient lab results, inpatient diagnostic studies, and consultation reports with Dorie.     Current outpatient and discharge medications have been reconciled for the patient.  Reviewed by: ANNE Suero          11/30/2023     6:35 PM   Date of TCM Phone Call   [facility]   Date of Admission 11/29/2023   Date of Discharge 11/30/2023   Discharge Disposition Home or Self Care     Risk for Readmission (LACE) Score: 3 (11/30/2023  6:00 AM)      History of Present Illness   Course During Hospital Stay:  73 y.o. female presents to the hospital with weakness and fatigue.  Patient began having general body aches, weakness, fatigue and worsening shortness of breath about 1 week ago.      COVID-19 infection  Strep pneumo pneumonia  AHRF  AECOPD  - requiring 2L O2, no formal diagnosis of COPD but CT chest with severe emphysematous changes and bibasilar infiltrates as well   - strep pneumo urinary antigen positive  - unasyn --> omnicef to complete course  - cont decadron x10 days  - started symbicort and spiriva here  - pulm followed -- will f/u in COPD clinic  - 6MWT at d/c showed need for 2L O2 at home -- arranged     Tobacco abuse  - counseled on cessation  - offered NRT but she declined    Patient does presents to the office today for follow-up regarding her hospital admission.  Patient states that she is breathing somewhat easier.  She did have questions regarding her albuterol and her Symbicort prescriptions.  She does have [facility] paperwork that needs to be completed.  Did explain that we needed to recheck her lab work due to impaired fasting glucose as  well as her having muscle twitches and cramps.       Review of Systems    Objective   Physical Exam  Vitals reviewed.   Constitutional:       Appearance: Normal appearance.   Cardiovascular:      Rate and Rhythm: Normal rate and regular rhythm.      Pulses: Normal pulses.      Heart sounds: Normal heart sounds, S1 normal and S2 normal. No murmur heard.  Pulmonary:      Effort: Pulmonary effort is normal. No respiratory distress.      Breath sounds: Normal breath sounds. Examination of the right-lower field reveals decreased breath sounds. Examination of the left-lower field reveals decreased breath sounds.   Skin:     General: Skin is warm and dry.   Neurological:      Mental Status: She is alert and oriented to person, place, and time.   Psychiatric:         Attention and Perception: Attention normal.         Mood and Affect: Mood normal.         Behavior: Behavior normal.         Assessment & Plan               Diagnosis Plan   1. Primary hypertension  CBC (No Diff)    Comprehensive Metabolic Panel      2. Hyperlipidemia, unspecified hyperlipidemia type  Lipid Panel      3. Impaired fasting glucose  CBC (No Diff)    Comprehensive Metabolic Panel    Hemoglobin A1c      4. Chronic obstructive pulmonary disease, unspecified COPD type  XR Chest 2 View      5. Shortness of breath  XR Chest 2 View      6. Muscle cramps  Magnesium    Vitamin B6      7. Hematuria, unspecified type  POCT urinalysis dipstick, automated

## 2023-12-14 ENCOUNTER — TELEPHONE (OUTPATIENT)
Dept: FAMILY MEDICINE CLINIC | Facility: CLINIC | Age: 73
End: 2023-12-14
Payer: COMMERCIAL

## 2023-12-14 NOTE — TELEPHONE ENCOUNTER
----- Message from Dorie Head sent at 12/14/2023  9:02 AM EST -----  Regarding: Work  Contact: [phone]   I don't know how to say this but I have no money coming in have no way 2 pay bills I need to go back to work no matter how sick I am I need an income, I have no financial help from anywhere, sometimes we do what we must just to survive

## 2023-12-18 LAB — PYRIDOXAL PHOS SERPL-MCNC: 3 UG/L (ref 3.4–65.2)

## 2023-12-21 ENCOUNTER — HOSPITAL ENCOUNTER (EMERGENCY)
Facility: HOSPITAL | Age: 73
Discharge: HOME | End: 2023-12-21
Attending: EMERGENCY MEDICINE
Payer: COMMERCIAL

## 2023-12-21 VITALS
SYSTOLIC BLOOD PRESSURE: 170 MMHG | TEMPERATURE: 98 F | OXYGEN SATURATION: 96 % | HEIGHT: 69 IN | WEIGHT: 100.31 LBS | DIASTOLIC BLOOD PRESSURE: 64 MMHG | HEART RATE: 56 BPM | RESPIRATION RATE: 18 BRPM | BODY MASS INDEX: 14.86 KG/M2

## 2023-12-21 DIAGNOSIS — U07.1 COVID-19: ICD-10-CM

## 2023-12-21 DIAGNOSIS — R11.0 NAUSEA: ICD-10-CM

## 2023-12-21 DIAGNOSIS — M79.10 MUSCLE PAIN: Primary | ICD-10-CM

## 2023-12-21 LAB
ALBUMIN SERPL-MCNC: 4.3 G/DL (ref 3.5–5.2)
ALBUMIN/GLOB SERPL: 1.4 G/DL
ALP SERPL-CCNC: 67 U/L (ref 39–117)
ALT SERPL W P-5'-P-CCNC: 9 U/L (ref 1–33)
ANION GAP SERPL CALCULATED.3IONS-SCNC: 9.2 MMOL/L (ref 5–15)
AST SERPL-CCNC: 14 U/L (ref 1–32)
BASOPHILS # BLD AUTO: 0.03 10*3/MM3 (ref 0–0.2)
BASOPHILS NFR BLD AUTO: 0.6 % (ref 0–1.5)
BILIRUB SERPL-MCNC: 0.3 MG/DL (ref 0–1.2)
BILIRUB UR QL STRIP: NEGATIVE
BUN SERPL-MCNC: 9 MG/DL (ref 8–23)
BUN/CREAT SERPL: 8.2 (ref 7–25)
CALCIUM SPEC-SCNC: 9.6 MG/DL (ref 8.6–10.5)
CHLORIDE SERPL-SCNC: 100 MMOL/L (ref 98–107)
CLARITY UR: CLEAR
CO2 SERPL-SCNC: 25.8 MMOL/L (ref 22–29)
COLOR UR: YELLOW
CREAT SERPL-MCNC: 1.1 MG/DL (ref 0.57–1)
D-LACTATE SERPL-SCNC: 0.6 MMOL/L (ref 0.5–2)
DEPRECATED RDW RBC AUTO: 44.5 FL (ref 37–54)
EGFRCR SERPLBLD CKD-EPI 2021: 53.8 ML/MIN/1.73
EOSINOPHIL # BLD AUTO: 0.31 10*3/MM3 (ref 0–0.4)
EOSINOPHIL NFR BLD AUTO: 6.2 % (ref 0.3–6.2)
ERYTHROCYTE [DISTWIDTH] IN BLOOD BY AUTOMATED COUNT: 12.8 % (ref 12.3–15.4)
GLOBULIN UR ELPH-MCNC: 3.1 GM/DL
GLUCOSE SERPL-MCNC: 129 MG/DL (ref 65–99)
GLUCOSE UR STRIP-MCNC: NEGATIVE MG/DL
HCT VFR BLD AUTO: 35.2 % (ref 34–46.6)
HGB BLD-MCNC: 11.7 G/DL (ref 12–15.9)
HGB UR QL STRIP.AUTO: NEGATIVE
HOLD SPECIMEN: NORMAL
HOLD SPECIMEN: NORMAL
IMM GRANULOCYTES # BLD AUTO: 0.01 10*3/MM3 (ref 0–0.05)
IMM GRANULOCYTES NFR BLD AUTO: 0.2 % (ref 0–0.5)
KETONES UR QL STRIP: ABNORMAL
LEUKOCYTE ESTERASE UR QL STRIP.AUTO: NEGATIVE
LIPASE SERPL-CCNC: 53 U/L (ref 13–60)
LYMPHOCYTES # BLD AUTO: 1.26 10*3/MM3 (ref 0.7–3.1)
LYMPHOCYTES NFR BLD AUTO: 25.3 % (ref 19.6–45.3)
MCH RBC QN AUTO: 31.9 PG (ref 26.6–33)
MCHC RBC AUTO-ENTMCNC: 33.2 G/DL (ref 31.5–35.7)
MCV RBC AUTO: 95.9 FL (ref 79–97)
MONOCYTES # BLD AUTO: 0.28 10*3/MM3 (ref 0.1–0.9)
MONOCYTES NFR BLD AUTO: 5.6 % (ref 5–12)
NEUTROPHILS NFR BLD AUTO: 3.1 10*3/MM3 (ref 1.7–7)
NEUTROPHILS NFR BLD AUTO: 62.1 % (ref 42.7–76)
NITRITE UR QL STRIP: NEGATIVE
NRBC BLD AUTO-RTO: 0 /100 WBC (ref 0–0.2)
NT-PROBNP SERPL-MCNC: 253.4 PG/ML (ref 0–900)
PH UR STRIP.AUTO: 5.5 [PH] (ref 5–8)
PLATELET # BLD AUTO: 249 10*3/MM3 (ref 140–450)
PMV BLD AUTO: 9 FL (ref 6–12)
POTASSIUM SERPL-SCNC: 4.2 MMOL/L (ref 3.5–5.2)
PROT SERPL-MCNC: 7.4 G/DL (ref 6–8.5)
PROT UR QL STRIP: NEGATIVE
RBC # BLD AUTO: 3.67 10*6/MM3 (ref 3.77–5.28)
SODIUM SERPL-SCNC: 135 MMOL/L (ref 136–145)
SP GR UR STRIP: 1.02 (ref 1–1.03)
UROBILINOGEN UR QL STRIP: ABNORMAL
WBC NRBC COR # BLD AUTO: 4.99 10*3/MM3 (ref 3.4–10.8)
WHOLE BLOOD HOLD COAG: NORMAL
WHOLE BLOOD HOLD SPECIMEN: NORMAL

## 2023-12-21 PROCEDURE — 80053 COMPREHEN METABOLIC PANEL: CPT | Performed by: EMERGENCY MEDICINE

## 2023-12-21 PROCEDURE — 83605 ASSAY OF LACTIC ACID: CPT | Performed by: EMERGENCY MEDICINE

## 2023-12-21 PROCEDURE — 83690 ASSAY OF LIPASE: CPT | Performed by: EMERGENCY MEDICINE

## 2023-12-21 PROCEDURE — 81003 URINALYSIS AUTO W/O SCOPE: CPT | Performed by: EMERGENCY MEDICINE

## 2023-12-21 PROCEDURE — 83880 ASSAY OF NATRIURETIC PEPTIDE: CPT | Performed by: NURSE PRACTITIONER

## 2023-12-21 PROCEDURE — 99283 EMERGENCY DEPT VISIT LOW MDM: CPT

## 2023-12-21 PROCEDURE — 85025 COMPLETE CBC W/AUTO DIFF WBC: CPT | Performed by: EMERGENCY MEDICINE

## 2023-12-21 RX ORDER — ONDANSETRON 4 MG/1
4 TABLET, ORALLY DISINTEGRATING ORAL 4 TIMES DAILY PRN
Qty: 15 TABLET | Refills: 0 | Status: SHIPPED | OUTPATIENT
Start: 2023-12-21

## 2023-12-21 RX ORDER — SODIUM CHLORIDE 0.9 % (FLUSH) 0.9 %
10 SYRINGE (ML) INJECTION AS NEEDED
Status: DISCONTINUED | OUTPATIENT
Start: 2023-12-21 | End: 2023-12-21 | Stop reason: HOSPADM

## 2023-12-21 NOTE — ED PROVIDER NOTES
Subjective   History of Present Illness  The patient presents to the emergency department and states that she is here tonight because she thinks she has over hydrated herself.  The patient states that she was admitted to the hospital on November 29 and was discharged on November 30.  She states she followed up with her pulmonologist and then followed up with her family doctor.  She states that she was told then that her urine was still dark because she was dehydrated.  She states that she has been increasing her fluids and drink about 6-7 bottles of water yesterday and states that she has been going to the bathroom more often.  She states that it lightened up and that she feels like she is swollen in her legs and ankles.  She states that she has been having tingling in her arms and twitching in her lower legs because she feels like her electrolytes are off balance from her hydration.  On exam her breath sounds are clear.  Her room air sat is 98% with a N95.  Her abdomen is soft and nontender with palpation.  I cannot appreciate any swelling to her feet or ankles.  She is neurovascular intact.  She denies any dysuria.  She reports no recent fevers.    History provided by:  Patient   used: No        Review of Systems   Constitutional:  Negative for chills and fever.   HENT:  Negative for congestion, ear pain and sore throat.    Eyes:  Negative for pain.   Respiratory:  Negative for cough, chest tightness, shortness of breath and wheezing.    Cardiovascular:  Positive for leg swelling. Negative for chest pain.   Gastrointestinal:  Negative for abdominal pain, diarrhea, nausea and vomiting.   Genitourinary:  Positive for frequency. Negative for dysuria, flank pain, hematuria and urgency.   Musculoskeletal:  Positive for myalgias. Negative for back pain, joint swelling, neck pain and neck stiffness.   Skin:  Negative for pallor and rash.   Neurological:  Positive for numbness. Negative for seizures  and headaches.   All other systems reviewed and are negative.      Past Medical History:   Diagnosis Date    Arthritis 2022    R.A.    Basal cell carcinoma     Cancer     Cancer     squamos cell carcinoma    COPD (chronic obstructive pulmonary disease)     Coronary artery disease     Diverticulosis can't remember    Heart murmur can't remember    mitral valve regurgitation    High blood pressure     High cholesterol     HL (hearing loss)     pretty bad    Inflammatory bowel disease can't remember    microscopic colitis    Scoliosis     was diagnoised while I was serving in the [employer]    Visual impairment     wear glasses       No Known Allergies    Past Surgical History:   Procedure Laterality Date    COLONOSCOPY  2017    HYSTERECTOMY      MOLE REMOVAL      OTHER SURGICAL HISTORY      fallopian tube dilation    TUBAL ABDOMINAL LIGATION         Family History   Problem Relation Age of Onset    Diabetes Mother     Alcohol abuse Mother     Ovarian cancer Sister     Cancer Sister         ovarian cancer    Alcohol abuse Father     Heart disease Father     Hyperlipidemia Father     Alcohol abuse Brother     Drug abuse Brother     Hypertension Brother        Social History     Socioeconomic History    Marital status: Single   Tobacco Use    Smoking status: Former     Packs/day: 0     Types: Cigarettes     Start date: 1983     Quit date: 2022     Years since quittin.8    Smokeless tobacco: Never    Tobacco comments:     No longer a smoker   Vaping Use    Vaping Use: Never used   Substance and Sexual Activity    Alcohol use: Never    Drug use: Never    Sexual activity: Not Currently     Partners: Male     Birth control/protection: Tubal ligation, Birth control pill, Hysterectomy           Objective   Physical Exam  Vitals and nursing note reviewed.   Constitutional:       General: She is not in acute distress.     Appearance: Normal appearance. She is not ill-appearing or  toxic-appearing.   HENT:      Head: Normocephalic and atraumatic.   Eyes:      General: No scleral icterus.     Conjunctiva/sclera: Conjunctivae normal.      Pupils: Pupils are equal, round, and reactive to light.   Cardiovascular:      Rate and Rhythm: Normal rate and regular rhythm.      Pulses: Normal pulses.   Pulmonary:      Effort: Pulmonary effort is normal. No respiratory distress.      Breath sounds: Normal breath sounds. No wheezing or rales.   Abdominal:      General: Abdomen is flat. There is no distension.      Palpations: Abdomen is soft.      Tenderness: There is no abdominal tenderness. There is no guarding or rebound.   Musculoskeletal:         General: No swelling or tenderness. Normal range of motion.      Cervical back: Normal range of motion.      Right lower leg: No edema.      Left lower leg: No edema.   Skin:     General: Skin is warm and dry.      Capillary Refill: Capillary refill takes less than 2 seconds.      Findings: No rash.   Neurological:      General: No focal deficit present.      Mental Status: She is alert and oriented to person, place, and time. Mental status is at baseline.   Psychiatric:         Mood and Affect: Mood normal.         Behavior: Behavior normal.         Procedures           ED Course                                             Medical Decision Making  Problems Addressed:  COVID-19: complicated acute illness or injury  Muscle pain: complicated acute illness or injury  Nausea: complicated acute illness or injury    Amount and/or Complexity of Data Reviewed  Labs: ordered.    Risk  Prescription drug management.        Final diagnoses:   Muscle pain   Nausea   COVID-19       ED Disposition  ED Disposition       ED Disposition   Discharge    Condition   Stable    Comment   --               Nahun Chamorro, APRN  [address] [facility] [address]  [place] [address]  [phone]    Call today  FOR FOLLOW UP         Medication List        New Prescriptions      ondansetron ODT 4  MG disintegrating tablet  Commonly known as: ZOFRAN-ODT  Place 1 tablet on the tongue 4 (Four) Times a Day As Needed for Nausea or Vomiting.               Where to Get Your Medications        These medications were sent to [id number]  [place] - [address] - [phone] [facility] [phone]   [address] [place] [address]      Phone: [phone]   ondansetron ODT 4 MG disintegrating tablet            Myah Holliday, ANNE  12/21/23 0636

## 2024-01-08 DIAGNOSIS — E78.5 HYPERLIPIDEMIA, UNSPECIFIED HYPERLIPIDEMIA TYPE: ICD-10-CM

## 2024-01-08 DIAGNOSIS — I10 PRIMARY HYPERTENSION: ICD-10-CM

## 2024-01-08 RX ORDER — AMLODIPINE BESYLATE 10 MG/1
10 TABLET ORAL DAILY
Qty: 90 TABLET | Refills: 3 | Status: SHIPPED | OUTPATIENT
Start: 2024-01-08

## 2024-01-08 RX ORDER — EZETIMIBE 10 MG/1
10 TABLET ORAL DAILY
Qty: 90 TABLET | Refills: 3 | Status: SHIPPED | OUTPATIENT
Start: 2024-01-08

## 2024-01-08 RX ORDER — PRAVASTATIN SODIUM 40 MG
40 TABLET ORAL DAILY
Qty: 90 TABLET | Refills: 3 | Status: SHIPPED | OUTPATIENT
Start: 2024-01-08

## 2024-01-09 ENCOUNTER — OFFICE VISIT (OUTPATIENT)
Dept: FAMILY MEDICINE CLINIC | Facility: CLINIC | Age: 74
End: 2024-01-09
Payer: COMMERCIAL

## 2024-01-09 VITALS
BODY MASS INDEX: 16.5 KG/M2 | DIASTOLIC BLOOD PRESSURE: 68 MMHG | SYSTOLIC BLOOD PRESSURE: 160 MMHG | TEMPERATURE: 96.3 F | WEIGHT: 111.4 LBS | HEART RATE: 74 BPM | HEIGHT: 69 IN | OXYGEN SATURATION: 94 %

## 2024-01-09 DIAGNOSIS — I10 PRIMARY HYPERTENSION: ICD-10-CM

## 2024-01-09 PROCEDURE — 99214 OFFICE O/P EST MOD 30 MIN: CPT | Performed by: NURSE PRACTITIONER

## 2024-01-09 RX ORDER — LOSARTAN POTASSIUM 100 MG/1
100 TABLET ORAL DAILY
Qty: 90 TABLET | Refills: 1 | Status: SHIPPED | OUTPATIENT
Start: 2024-01-09

## 2024-01-09 NOTE — PROGRESS NOTES
"Chief Complaint  Hypertension (2 week follow up)    Subjective         Dorie Head presents to [facility]  Patient presents to the office today for a follow-up regarding her hypertension as well as her FMLA.  Patient's blood pressure on arrival is 160/68.  She does state that her blood pressure has been doing much better at home.  She states that when she uses her Symbicort her blood pressure does go up slightly.  Patient does state that she is felt better than she has in a long time recently.  She states that she is ready to go back to work.  He states that her fatigue is not as significant, the dizziness has resolved she is able to focus.  Explained that we would put her back to work on January 21.  [place] paperwork was filled [place] to release her back to work and this was faxed to [employer]    Hypertension         Objective     Vitals:    01/09/24 1309   BP: 160/68   BP Location: Right arm   Patient Position: Sitting   Cuff Size: Adult   Pulse: 74   Temp: 96.3 °F (35.7 °C)   TempSrc: Temporal   SpO2: 94%   Weight: 50.5 kg (111 lb 6.4 oz)   Height: 175.3 cm (69\")      Body mass index is 16.45 kg/m².    BMI is below normal parameters (malnutrition). Recommendations: none (medical contraindication)        Physical Exam  Vitals reviewed.   Constitutional:       Appearance: Normal appearance.   Cardiovascular:      Rate and Rhythm: Normal rate and regular rhythm.      Pulses: Normal pulses.      Heart sounds: Normal heart sounds, S1 normal and S2 normal. No murmur heard.  Pulmonary:      Effort: Pulmonary effort is normal. No respiratory distress.      Breath sounds: Normal breath sounds.   Skin:     General: Skin is warm and dry.   Neurological:      Mental Status: She is alert and oriented to person, place, and time.   Psychiatric:         Attention and Perception: Attention normal.         Mood and Affect: Mood normal.         Behavior: Behavior normal.          Result Review :   The " following data was reviewed by: ANNE Suero on 01/09/2024:      Procedures    Assessment and Plan   Diagnoses and all orders for this visit:    1. Primary hypertension  -     losartan (COZAAR) 100 MG tablet; Take 1 tablet by mouth Daily.  Dispense: 90 tablet; Refill: 1      31 minutes was spent with the patient reviewing records, counseling, assessing, documenting and filling out FMLA paperwork.    Follow Up   Return in about 3 months (around 4/9/2024) for Recheck.  Patient was given instructions and counseling regarding her condition or for health maintenance advice. Please see specific information pulled into the AVS if appropriate.

## 2024-02-07 DIAGNOSIS — R11.0 NAUSEA: ICD-10-CM

## 2024-02-07 RX ORDER — ONDANSETRON HYDROCHLORIDE 8 MG/1
8 TABLET, FILM COATED ORAL EVERY 8 HOURS PRN
Qty: 30 TABLET | Refills: 0 | Status: SHIPPED | OUTPATIENT
Start: 2024-02-07

## 2024-03-14 DIAGNOSIS — R11.0 NAUSEA: ICD-10-CM

## 2024-03-15 DIAGNOSIS — R11.0 NAUSEA: ICD-10-CM

## 2024-03-15 RX ORDER — ONDANSETRON HYDROCHLORIDE 8 MG/1
8 TABLET, FILM COATED ORAL EVERY 8 HOURS PRN
Qty: 30 TABLET | Refills: 0 | Status: SHIPPED | OUTPATIENT
Start: 2024-03-15

## 2024-03-25 DIAGNOSIS — J01.00 ACUTE NON-RECURRENT MAXILLARY SINUSITIS: Primary | ICD-10-CM

## 2024-03-25 RX ORDER — AMOXICILLIN AND CLAVULANATE POTASSIUM 875; 125 MG/1; MG/1
1 TABLET, FILM COATED ORAL 2 TIMES DAILY
Qty: 20 TABLET | Refills: 0 | Status: SHIPPED | OUTPATIENT
Start: 2024-03-25 | End: 2024-04-04

## 2024-05-26 PROCEDURE — 87086 URINE CULTURE/COLONY COUNT: CPT | Performed by: FAMILY MEDICINE

## 2024-05-30 DIAGNOSIS — R11.0 NAUSEA: ICD-10-CM

## 2024-05-31 RX ORDER — ONDANSETRON HYDROCHLORIDE 8 MG/1
8 TABLET, FILM COATED ORAL EVERY 8 HOURS PRN
Qty: 30 TABLET | Refills: 0 | Status: SHIPPED | OUTPATIENT
Start: 2024-05-31

## 2024-06-06 ENCOUNTER — APPOINTMENT (OUTPATIENT)
Facility: HOSPITAL | Age: 74
End: 2024-06-06
Payer: COMMERCIAL

## 2024-06-06 ENCOUNTER — HOSPITAL ENCOUNTER (EMERGENCY)
Facility: HOSPITAL | Age: 74
Discharge: HOME | End: 2024-06-06
Attending: EMERGENCY MEDICINE | Admitting: EMERGENCY MEDICINE
Payer: COMMERCIAL

## 2024-06-06 VITALS
OXYGEN SATURATION: 99 % | BODY MASS INDEX: 15.93 KG/M2 | HEIGHT: 69 IN | SYSTOLIC BLOOD PRESSURE: 166 MMHG | TEMPERATURE: 98 F | RESPIRATION RATE: 16 BRPM | DIASTOLIC BLOOD PRESSURE: 72 MMHG | WEIGHT: 107.58 LBS | HEART RATE: 62 BPM

## 2024-06-06 DIAGNOSIS — M79.662 PAIN OF LEFT CALF: Primary | ICD-10-CM

## 2024-06-06 DIAGNOSIS — I82.432 ACUTE DEEP VEIN THROMBOSIS (DVT) OF POPLITEAL VEIN OF LEFT LOWER EXTREMITY: ICD-10-CM

## 2024-06-06 LAB
ALBUMIN SERPL-MCNC: 4.2 G/DL (ref 3.5–5.2)
ALBUMIN/GLOB SERPL: 1.7 G/DL
ALP SERPL-CCNC: 68 U/L (ref 39–117)
ALT SERPL W P-5'-P-CCNC: 8 U/L (ref 1–33)
ANION GAP SERPL CALCULATED.3IONS-SCNC: 11.7 MMOL/L (ref 5–15)
AST SERPL-CCNC: 15 U/L (ref 1–32)
BASOPHILS # BLD AUTO: 0.07 10*3/MM3 (ref 0–0.2)
BASOPHILS NFR BLD AUTO: 1 % (ref 0–1.5)
BH CV LOW VAS LEFT POPLITEAL SPONT: 1
BH CV LOWER VASCULAR LEFT COMMON FEMORAL AUGMENT: NORMAL
BH CV LOWER VASCULAR LEFT COMMON FEMORAL COMPETENT: NORMAL
BH CV LOWER VASCULAR LEFT COMMON FEMORAL COMPRESS: NORMAL
BH CV LOWER VASCULAR LEFT COMMON FEMORAL PHASIC: NORMAL
BH CV LOWER VASCULAR LEFT COMMON FEMORAL SPONT: NORMAL
BH CV LOWER VASCULAR LEFT DISTAL FEMORAL COMPRESS: NORMAL
BH CV LOWER VASCULAR LEFT GASTRONEMIUS COMPRESS: NORMAL
BH CV LOWER VASCULAR LEFT GREATER SAPH AK COMPRESS: NORMAL
BH CV LOWER VASCULAR LEFT GREATER SAPH BK COMPRESS: NORMAL
BH CV LOWER VASCULAR LEFT LESSER SAPH COMPRESS: NORMAL
BH CV LOWER VASCULAR LEFT MID FEMORAL AUGMENT: NORMAL
BH CV LOWER VASCULAR LEFT MID FEMORAL COMPETENT: NORMAL
BH CV LOWER VASCULAR LEFT MID FEMORAL COMPRESS: NORMAL
BH CV LOWER VASCULAR LEFT MID FEMORAL PHASIC: NORMAL
BH CV LOWER VASCULAR LEFT MID FEMORAL SPONT: NORMAL
BH CV LOWER VASCULAR LEFT PERONEAL COMPRESS: NORMAL
BH CV LOWER VASCULAR LEFT POPLITEAL AUGMENT: NORMAL
BH CV LOWER VASCULAR LEFT POPLITEAL COMPETENT: NORMAL
BH CV LOWER VASCULAR LEFT POPLITEAL COMPRESS: NORMAL
BH CV LOWER VASCULAR LEFT POPLITEAL PHASIC: NORMAL
BH CV LOWER VASCULAR LEFT POPLITEAL SPONT: NORMAL
BH CV LOWER VASCULAR LEFT POPLITEAL THROMBUS: NORMAL
BH CV LOWER VASCULAR LEFT POSTERIOR TIBIAL COMPRESS: NORMAL
BH CV LOWER VASCULAR LEFT PROXIMAL FEMORAL COMPRESS: NORMAL
BH CV LOWER VASCULAR LEFT SAPHENOFEMORAL JUNCTION COMPRESS: NORMAL
BH CV LOWER VASCULAR RIGHT COMMON FEMORAL AUGMENT: NORMAL
BH CV LOWER VASCULAR RIGHT COMMON FEMORAL COMPETENT: NORMAL
BH CV LOWER VASCULAR RIGHT COMMON FEMORAL COMPRESS: NORMAL
BH CV LOWER VASCULAR RIGHT COMMON FEMORAL PHASIC: NORMAL
BH CV LOWER VASCULAR RIGHT COMMON FEMORAL SPONT: NORMAL
BH CV VAS PRELIMINARY FINDINGS SCRIPTING: 1
BILIRUB SERPL-MCNC: 0.4 MG/DL (ref 0–1.2)
BUN SERPL-MCNC: 11 MG/DL (ref 8–23)
BUN/CREAT SERPL: 14.1 (ref 7–25)
CALCIUM SPEC-SCNC: 9.6 MG/DL (ref 8.6–10.5)
CHLORIDE SERPL-SCNC: 104 MMOL/L (ref 98–107)
CO2 SERPL-SCNC: 27.3 MMOL/L (ref 22–29)
CREAT SERPL-MCNC: 0.78 MG/DL (ref 0.57–1)
DEPRECATED RDW RBC AUTO: 42.5 FL (ref 37–54)
EGFRCR SERPLBLD CKD-EPI 2021: 81.3 ML/MIN/1.73
EOSINOPHIL # BLD AUTO: 0.31 10*3/MM3 (ref 0–0.4)
EOSINOPHIL NFR BLD AUTO: 4.4 % (ref 0.3–6.2)
ERYTHROCYTE [DISTWIDTH] IN BLOOD BY AUTOMATED COUNT: 12.4 % (ref 12.3–15.4)
GLOBULIN UR ELPH-MCNC: 2.5 GM/DL
GLUCOSE SERPL-MCNC: 90 MG/DL (ref 65–99)
HCT VFR BLD AUTO: 36.3 % (ref 34–46.6)
HGB BLD-MCNC: 12.5 G/DL (ref 12–15.9)
IMM GRANULOCYTES # BLD AUTO: 0.02 10*3/MM3 (ref 0–0.05)
IMM GRANULOCYTES NFR BLD AUTO: 0.3 % (ref 0–0.5)
LYMPHOCYTES # BLD AUTO: 2.18 10*3/MM3 (ref 0.7–3.1)
LYMPHOCYTES NFR BLD AUTO: 30.7 % (ref 19.6–45.3)
MCH RBC QN AUTO: 32.7 PG (ref 26.6–33)
MCHC RBC AUTO-ENTMCNC: 34.4 G/DL (ref 31.5–35.7)
MCV RBC AUTO: 95 FL (ref 79–97)
MONOCYTES # BLD AUTO: 0.45 10*3/MM3 (ref 0.1–0.9)
MONOCYTES NFR BLD AUTO: 6.3 % (ref 5–12)
NEUTROPHILS NFR BLD AUTO: 4.07 10*3/MM3 (ref 1.7–7)
NEUTROPHILS NFR BLD AUTO: 57.3 % (ref 42.7–76)
NRBC BLD AUTO-RTO: 0 /100 WBC (ref 0–0.2)
PLATELET # BLD AUTO: 253 10*3/MM3 (ref 140–450)
PMV BLD AUTO: 9.4 FL (ref 6–12)
POTASSIUM SERPL-SCNC: 3.7 MMOL/L (ref 3.5–5.2)
PROT SERPL-MCNC: 6.7 G/DL (ref 6–8.5)
RBC # BLD AUTO: 3.82 10*6/MM3 (ref 3.77–5.28)
SODIUM SERPL-SCNC: 143 MMOL/L (ref 136–145)
WBC NRBC COR # BLD AUTO: 7.1 10*3/MM3 (ref 3.4–10.8)

## 2024-06-06 PROCEDURE — 36415 COLL VENOUS BLD VENIPUNCTURE: CPT

## 2024-06-06 PROCEDURE — 80053 COMPREHEN METABOLIC PANEL: CPT | Performed by: EMERGENCY MEDICINE

## 2024-06-06 PROCEDURE — 85025 COMPLETE CBC W/AUTO DIFF WBC: CPT | Performed by: EMERGENCY MEDICINE

## 2024-06-06 PROCEDURE — 93971 EXTREMITY STUDY: CPT | Performed by: SURGERY

## 2024-06-06 PROCEDURE — 93971 EXTREMITY STUDY: CPT

## 2024-06-06 PROCEDURE — 99284 EMERGENCY DEPT VISIT MOD MDM: CPT

## 2024-06-06 RX ADMIN — APIXABAN 10 MG: 5 TABLET, FILM COATED ORAL at 12:05

## 2024-06-06 NOTE — Clinical Note
[facility] EMERGENCY ROOM  [address] [place] [address]  Phone: [phone]  Fax: [phone]    Dorie Head was seen and treated in our emergency department on 6/6/2024.  She may return to work on 06/07/2024.         Thank you for choosing [facility].    Neeru Mcclendon RN

## 2024-06-06 NOTE — DISCHARGE INSTRUCTIONS
Your ultrasound showed that you do have an active blood clot (DVT) located in the popliteal vein just behind your left knee and calf that is causing pain and swelling.    To treat this take the Eliquis blood thinner twice a day as directed (the first week you take a double dose as part of the loading week) and then taper down as per the directions.    Keep your leg elevated and follow-up with your doctor to undergo further workup for why you had a blood clot.

## 2024-06-06 NOTE — ED PROVIDER NOTES
Time: 9:33 AM EDT  Date of encounter:  6/6/2024  Independent Historian/Clinical History and Information was obtained by:   Patient    History is limited by: N/A    Chief Complaint: Left calf pain      History of Present Illness:  Patient is a 73 y.o. year old female who presents to the emergency department for evaluation of left calf pain and some Swelling for 1 week, getting worse    She denies any inciting injury but it was painful enough at work that she left early to come straight to the ED.    She denies any recent overuse of the calf muscles or injury to the calf or Achilles or the leg bones.    Pain is all localized to the posterior left calf.    No previous history of blood clots and she denies chest pain or dyspnea or any fevers.    HPI    Patient Care Team  Primary Care Provider: Nahun Chamorro APRN    Past Medical History:     No Known Allergies  Past Medical History:   Diagnosis Date    Arthritis November 2022    R.A.    Basal cell carcinoma     Cancer     Cancer     squamos cell carcinoma    COPD (chronic obstructive pulmonary disease)     Coronary artery disease     Diverticulosis can't remember    Heart murmur can't remember    mitral valve regurgitation    High blood pressure     High cholesterol     HL (hearing loss) 2011    pretty bad    Inflammatory bowel disease can't remember    microscopic colitis    Scoliosis 1983    was diagnoised while I was serving in the [employer]    Urinary tract infection     Cant remember    Visual impairment     wear glasses     Past Surgical History:   Procedure Laterality Date    COLONOSCOPY  2017    HYSTERECTOMY      MOLE REMOVAL      OTHER SURGICAL HISTORY      fallopian tube dilation    TUBAL ABDOMINAL LIGATION       Family History   Problem Relation Age of Onset    Diabetes Mother     Alcohol abuse Mother     Ovarian cancer Sister     Cancer Sister         ovarian cancer    Alcohol abuse Father     Heart disease Father     Hyperlipidemia Father     Alcohol  abuse Brother     Drug abuse Brother     Hypertension Brother        Home Medications:  Prior to Admission medications    Medication Sig Start Date End Date Taking? Authorizing Provider   acetaminophen (TYLENOL) 500 MG tablet Take 1 tablet by mouth Every 8 (Eight) Hours As Needed for Mild Pain.    Provider, MD Zulema   albuterol sulfate  (90 Base) MCG/ACT inhaler Inhale 2 puffs Every 4 (Four) Hours As Needed for Wheezing. 12/7/23   Anna Gaitan APRN   amLODIPine (NORVASC) 10 MG tablet Take 1 tablet by mouth Daily. 1/8/24   Nahun Chamorro APRN   aspirin 81 MG EC tablet Take 1 tablet by mouth Daily. 3/14/23   Nahun Chamorro APRN   budesonide-formoterol (SYMBICORT) 160-4.5 MCG/ACT inhaler Inhale 2 puffs 2 (Two) Times a Day. 11/30/23   Sergio Pineda MD   carvedilol (COREG) 12.5 MG tablet Take 1 tablet by mouth 2 (Two) Times a Day With Meals. 3/14/23   Nahun Chamorro APRN   ezetimibe (ZETIA) 10 MG tablet Take 1 tablet by mouth Daily. 1/8/24   Nahun Chamorro APRN   losartan (COZAAR) 100 MG tablet Take 1 tablet by mouth Daily. 1/9/24   Nahun Chamorro APRN   nitrofurantoin, macrocrystal-monohydrate, (MACROBID) 100 MG capsule Take 1 capsule by mouth 2 (Two) Times a Day. 5/26/24   Darius Roa MD   ondansetron (ZOFRAN) 8 MG tablet Take 1 tablet by mouth Every 8 (Eight) Hours As Needed for Nausea or Vomiting. 3/15/24   Nahun Chamorro APRN   ondansetron (ZOFRAN) 8 MG tablet Take 1 tablet by mouth Every 8 (Eight) Hours As Needed for Nausea or Vomiting. 5/31/24   Nahun Chamorro APRN   ondansetron ODT (ZOFRAN-ODT) 4 MG disintegrating tablet Place 1 tablet on the tongue 4 (Four) Times a Day As Needed for Nausea or Vomiting. 12/21/23   Myah Holliday APRN   pravastatin (PRAVACHOL) 40 MG tablet Take 1 tablet by mouth Daily. 1/8/24   Nahun Chamorro APRN   tiotropium (Spiriva HandiHaler) 18 MCG per inhalation capsule Place 1 capsule into inhaler and inhale Daily. 11/30/23   Sergio Pineda MD        Social  "History:   Social History     Tobacco Use    Smoking status: Former     Current packs/day: 0.00     Types: Cigarettes     Quit date: 1983     Years since quittin.5    Smokeless tobacco: Never    Tobacco comments:     No longer a smoker   Vaping Use    Vaping status: Never Used   Substance Use Topics    Alcohol use: Never    Drug use: Never         Review of Systems:  Review of Systems   I performed a 10 point review of systems which was all negative, except for the positives found in the HPI above.  Physical Exam:  /72 (BP Location: Left arm, Patient Position: Sitting)   Pulse 62   Temp 98 °F (36.7 °C) (Oral)   Resp 16   Ht 175.3 cm (69\")   Wt 48.8 kg (107 lb 9.4 oz)   SpO2 99%   BMI 15.89 kg/m²     Physical Exam   General: Awake alert and in no obvious distress    HEENT: Head normocephalic atraumatic, eyes PERRLA EOMI, nose normal, oropharynx normal.    Neck: Supple full range of motion, no meningismus, no lymphadenopathy    Heart: Regular rate and rhythm, no murmurs or rubs, 2+ radial pulses bilaterally    Lungs: Clear to auscultation bilaterally without wheezes or crackles, no respiratory distress    Abdomen: Soft, nontender, nondistended, no rebound or guarding    Skin: Warm, dry, no rash    Musculoskeletal: Normal range of motion, no lower extremity edema, but she does have reproducible tenderness over the posterior left calf only, but good pulses in the left foot and no erythema or signs of infection or injury    Neurologic: Oriented x3, no motor deficits no sensory deficits    Psychiatric: Mood appears stable, no psychosis          Procedures:  Procedures      Medical Decision Making:      Comorbidities that affect care:    COPD    External Notes reviewed:    None      The following orders were placed and all results were independently analyzed by me:  Orders Placed This Encounter   Procedures    Comprehensive Metabolic Panel    CBC Auto Differential    CBC & Differential "       Medications Given in the Emergency Department:  Medications   apixaban (ELIQUIS) tablet 10 mg (10 mg Oral Given 6/6/24 1205)        ED Course:         Labs:    Lab Results (last 24 hours)       Procedure Component Value Units Date/Time    CBC & Differential [[id number]]  (Normal) Collected: 06/06/24 1156    Specimen: Blood Updated: 06/06/24 1200    Narrative:      The following orders were created for panel order CBC & Differential.  Procedure                               Abnormality         Status                     ---------                               -----------         ------                     CBC Auto Differential[[id number]]        Normal              Final result                 Please view results for these tests on the individual orders.    Comprehensive Metabolic Panel [[id number]] Collected: 06/06/24 1156    Specimen: Blood Updated: 06/06/24 1219     Glucose 90 mg/dL      BUN 11 mg/dL      Creatinine 0.78 mg/dL      Sodium 143 mmol/L      Potassium 3.7 mmol/L      Chloride 104 mmol/L      CO2 27.3 mmol/L      Calcium 9.6 mg/dL      Total Protein 6.7 g/dL      Albumin 4.2 g/dL      ALT (SGPT) 8 U/L      AST (SGOT) 15 U/L      Alkaline Phosphatase 68 U/L      Total Bilirubin 0.4 mg/dL      Globulin 2.5 gm/dL      A/G Ratio 1.7 g/dL      BUN/Creatinine Ratio 14.1     Anion Gap 11.7 mmol/L      eGFR 81.3 mL/min/1.73     Narrative:      GFR Normal >60  Chronic Kidney Disease <60  Kidney Failure <15    The GFR formula is only valid for adults with stable renal function between ages 18 and 70.    CBC Auto Differential [[id number]]  (Normal) Collected: 06/06/24 1156    Specimen: Blood Updated: 06/06/24 1200     WBC 7.10 10*3/mm3      RBC 3.82 10*6/mm3      Hemoglobin 12.5 g/dL      Hematocrit 36.3 %      MCV 95.0 fL      MCH 32.7 pg      MCHC 34.4 g/dL      RDW 12.4 %      RDW-SD 42.5 fl      MPV 9.4 fL      Platelets 253 10*3/mm3      Neutrophil % 57.3 %      Lymphocyte % 30.7 %      Monocyte % 6.3 %       Eosinophil % 4.4 %      Basophil % 1.0 %      Immature Grans % 0.3 %      Neutrophils, Absolute 4.07 10*3/mm3      Lymphocytes, Absolute 2.18 10*3/mm3      Monocytes, Absolute 0.45 10*3/mm3      Eosinophils, Absolute 0.31 10*3/mm3      Basophils, Absolute 0.07 10*3/mm3      Immature Grans, Absolute 0.02 10*3/mm3      nRBC 0.0 /100 WBC              Imaging:    Duplex Venous Lower Extremity - Left CAR    Result Date: 6/6/2024    Acute left lower extremity deep vein thrombosis noted in the popliteal.  There is thickening of the left popliteal valve cusp with layered nonocclusive thrombus.   All other left sided veins appeared normal.        Differential Diagnosis and Discussion:    Extremity Pain: Differential diagnosis includes but is not limited to soft tissue sprain, tendonitis, tendon injury, dislocation, fracture, deep vein thrombosis, arterial insufficiency, osteoarthritis, bursitis, and ligamentous damage.    Ultrasound impression was interpreted by me.     MDM     Amount and/or Complexity of Data Reviewed  Clinical lab tests: reviewed  Tests in the radiology section of CPT®: reviewed             This patient is a 73-year-old female with some nontraumatic left posterior calf pain for 1 week.    She is tender to touch over the left calf and slightly swollen but no discoloration or redness or warmth or sign of infection in the left leg and she is neurovascularly intact distally.    I do not see any bony injury and I do not think she needs x-rays.    I am getting a duplex venous ultrasound to rule out acute DVT in the left leg.    I also considered possibility of just a musculoskeletal calf Strain.        Ultrasound came back positive for acute DVT in left popliteal vein so I started on Eliquis anticoagulation after checking lab work and I will have her follow-up with her doctor.            Patient Care Considerations:          Consultants/Shared Management Plan:        Social Determinants of Health:    Patient  is independent, reliable, and has access to care.       Disposition and Care Coordination:    Discharged: The patient is suitable and stable for discharge with no need for consideration of admission.    I have explained the patient´s condition, diagnoses and treatment plan based on the information available to me at this time. I have answered questions and addressed any concerns. The patient has a good  understanding of the patient´s diagnosis, condition, and treatment plan as can be expected at this point. The vital signs have been stable. The patient´s condition is stable and appropriate for discharge from the emergency department.      The patient will pursue further outpatient evaluation with the primary care physician or other designated or consulting physician as outlined in the discharge instructions. They are agreeable to this plan of care and follow-up instructions have been explained in detail. The patient has received these instructions in written format and has expressed an understanding of the discharge instructions. The patient is aware that any significant change in condition or worsening of symptoms should prompt an immediate return to this or the closest emergency department or call to 911.  I have explained discharge medications and the need for follow up with the patient/caretakers. This was also printed in the discharge instructions. Patient was discharged with the following medications and follow up:      Medication List        New Prescriptions      Apixaban Starter Pack tablet therapy pack  Take two 5 mg tablets by mouth every 12 hours for 7 days. Followed by one 5 mg tablet every 12 hours. (Dispense starter pack if available)               Where to Get Your Medications        These medications were sent to [facility] [address] [place], [place] [address] [address] - [phone] [facility] [phone] [address] [place] [address]      Phone: [phone]   Apixaban Starter Pack tablet  therapy Nahun Bright, APRN  [address]  [place] [address]  [phone]    Call in 1 day  for a follow-up appointment       Final diagnoses:   Pain of left calf   Acute deep vein thrombosis (DVT) of popliteal vein of left lower extremity        ED Disposition       ED Disposition   Discharge    Condition   Stable    Comment   --               This medical record created using voice recognition software.             Emre Elias MD  06/06/24 9398

## 2024-06-06 NOTE — Clinical Note
[facility] EMERGENCY ROOM  [address] [place] [address]  Phone: [phone]  Fax: [phone]    Dorie Head was seen and treated in our emergency department on 6/6/2024.  She may return to work on 06/07/2024.         Thank you for choosing [facility].    Neeru Mcclendon RN

## 2024-06-18 ENCOUNTER — OFFICE VISIT (OUTPATIENT)
Dept: FAMILY MEDICINE CLINIC | Facility: CLINIC | Age: 74
End: 2024-06-18
Payer: COMMERCIAL

## 2024-06-18 VITALS
TEMPERATURE: 97.5 F | HEIGHT: 69 IN | DIASTOLIC BLOOD PRESSURE: 66 MMHG | HEART RATE: 62 BPM | WEIGHT: 107 LBS | OXYGEN SATURATION: 95 % | SYSTOLIC BLOOD PRESSURE: 122 MMHG | BODY MASS INDEX: 15.85 KG/M2

## 2024-06-18 DIAGNOSIS — I82.4Y2 ACUTE DEEP VEIN THROMBOSIS (DVT) OF PROXIMAL VEIN OF LEFT LOWER EXTREMITY: Primary | ICD-10-CM

## 2024-06-18 PROCEDURE — 99213 OFFICE O/P EST LOW 20 MIN: CPT | Performed by: NURSE PRACTITIONER

## 2024-06-18 RX ORDER — APIXABAN 5 MG/1
5 TABLET, FILM COATED ORAL EVERY 12 HOURS SCHEDULED
Qty: 180 TABLET | Refills: 0 | Status: SHIPPED | OUTPATIENT
Start: 2024-06-18

## 2024-06-18 RX ORDER — APIXABAN 5 MG/1
5 TABLET, FILM COATED ORAL EVERY 12 HOURS SCHEDULED
COMMUNITY
Start: 2024-06-09 | End: 2024-06-18 | Stop reason: SDUPTHER

## 2024-06-18 NOTE — PROGRESS NOTES
"Chief Complaint  Leg Swelling (ER follow up for DVT)    Subjective         Dorie Head presents to [facility]  Pt presents for ER follow up where she was diagnosed with DVT Left leg on 6/6/24. She is currently taking Eliquis BID. She states her leg swelling has improved but still persists at her sock line. She has tenderness over dorsal midfoot x 4 days. She states she wants to see how this improves and will follow up if this persists. She asked about wearing compression stockings. She has no other health concerns. No trouble breathing, chest pain, palpitations. She needs refill on Eliqis.     Leg Swelling         Objective     Vitals:    06/18/24 0959   BP: 122/66   BP Location: Right arm   Patient Position: Sitting   Cuff Size: Adult   Pulse: 62   Temp: 97.5 °F (36.4 °C)   TempSrc: Temporal   SpO2: 95%   Weight: 48.5 kg (107 lb)   Height: 175.3 cm (69\")      Body mass index is 15.8 kg/m².             Physical Exam  Vitals reviewed.   Constitutional:       Appearance: Normal appearance.   Cardiovascular:      Rate and Rhythm: Normal rate and regular rhythm.      Pulses: Normal pulses.      Heart sounds: Normal heart sounds, S1 normal and S2 normal. No murmur heard.  Pulmonary:      Effort: Pulmonary effort is normal. No respiratory distress.      Breath sounds: Normal breath sounds.   Musculoskeletal:      Left lower leg: Swelling present.      Comments: Mild edema noted    Skin:     General: Skin is warm and dry.   Neurological:      Mental Status: She is alert and oriented to person, place, and time.   Psychiatric:         Attention and Perception: Attention normal.         Mood and Affect: Mood normal.         Behavior: Behavior normal.          Result Review :   The following data was reviewed by: ANNE Suero on 06/18/2024:      Procedures    Assessment and Plan   Diagnoses and all orders for this visit:    1. Acute deep vein thrombosis (DVT) of proximal vein of left " lower extremity (Primary)  -     Eliquis 5 MG tablet tablet; Take 1 tablet by mouth Every 12 (Twelve) Hours.  Dispense: 180 tablet; Refill: 0          Follow Up   Return if symptoms worsen or fail to improve.  Patient was given instructions and counseling regarding her condition or for health maintenance advice. Please see specific information pulled into the AVS if appropriate.

## 2024-06-26 DIAGNOSIS — R11.0 NAUSEA: ICD-10-CM

## 2024-06-26 RX ORDER — ONDANSETRON HYDROCHLORIDE 8 MG/1
8 TABLET, FILM COATED ORAL EVERY 8 HOURS PRN
Qty: 30 TABLET | Refills: 0 | Status: SHIPPED | OUTPATIENT
Start: 2024-06-26

## 2024-07-06 DIAGNOSIS — I10 PRIMARY HYPERTENSION: ICD-10-CM

## 2024-07-08 RX ORDER — LOSARTAN POTASSIUM 100 MG/1
100 TABLET ORAL DAILY
Qty: 90 TABLET | Refills: 0 | Status: SHIPPED | OUTPATIENT
Start: 2024-07-08

## 2024-08-02 DIAGNOSIS — R11.0 NAUSEA: ICD-10-CM

## 2024-08-02 RX ORDER — ONDANSETRON HYDROCHLORIDE 8 MG/1
8 TABLET, FILM COATED ORAL EVERY 8 HOURS PRN
Qty: 30 TABLET | Refills: 0 | Status: SHIPPED | OUTPATIENT
Start: 2024-08-02

## 2024-08-02 RX ORDER — ONDANSETRON HYDROCHLORIDE 8 MG/1
8 TABLET, FILM COATED ORAL EVERY 8 HOURS PRN
Qty: 30 TABLET | Refills: 0 | Status: SHIPPED | OUTPATIENT
Start: 2024-08-02 | End: 2024-08-02 | Stop reason: SDUPTHER

## 2024-08-23 DIAGNOSIS — R11.0 NAUSEA: ICD-10-CM

## 2024-08-23 RX ORDER — ONDANSETRON HYDROCHLORIDE 8 MG/1
8 TABLET, FILM COATED ORAL EVERY 8 HOURS PRN
Qty: 30 TABLET | Refills: 0 | Status: SHIPPED | OUTPATIENT
Start: 2024-08-23

## 2024-10-02 DIAGNOSIS — R11.0 NAUSEA: ICD-10-CM

## 2024-10-02 RX ORDER — ONDANSETRON 8 MG/1
8 TABLET, FILM COATED ORAL EVERY 8 HOURS PRN
Qty: 30 TABLET | Refills: 0 | Status: SHIPPED | OUTPATIENT
Start: 2024-10-02

## 2024-10-04 DIAGNOSIS — I10 PRIMARY HYPERTENSION: ICD-10-CM

## 2024-10-04 RX ORDER — LOSARTAN POTASSIUM 100 MG/1
100 TABLET ORAL DAILY
Qty: 90 TABLET | Refills: 0 | Status: SHIPPED | OUTPATIENT
Start: 2024-10-04

## 2024-10-17 DIAGNOSIS — I82.4Y2 ACUTE DEEP VEIN THROMBOSIS (DVT) OF PROXIMAL VEIN OF LEFT LOWER EXTREMITY: ICD-10-CM

## 2024-10-17 RX ORDER — APIXABAN 5 MG/1
5 TABLET, FILM COATED ORAL EVERY 12 HOURS SCHEDULED
Qty: 180 TABLET | Refills: 0 | Status: SHIPPED | OUTPATIENT
Start: 2024-10-17

## 2024-11-01 DIAGNOSIS — R11.0 NAUSEA: ICD-10-CM

## 2024-11-01 RX ORDER — ONDANSETRON 8 MG/1
8 TABLET, FILM COATED ORAL EVERY 8 HOURS PRN
Qty: 30 TABLET | Refills: 0 | Status: SHIPPED | OUTPATIENT
Start: 2024-11-01

## 2024-12-08 DIAGNOSIS — R11.0 NAUSEA: ICD-10-CM

## 2024-12-09 RX ORDER — ONDANSETRON 8 MG/1
8 TABLET, FILM COATED ORAL EVERY 8 HOURS PRN
Qty: 30 TABLET | Refills: 0 | Status: SHIPPED | OUTPATIENT
Start: 2024-12-09

## 2024-12-26 DIAGNOSIS — E78.5 HYPERLIPIDEMIA, UNSPECIFIED HYPERLIPIDEMIA TYPE: ICD-10-CM

## 2024-12-26 DIAGNOSIS — I10 PRIMARY HYPERTENSION: ICD-10-CM

## 2024-12-26 NOTE — TELEPHONE ENCOUNTER
UPCOMING APPTS  No upcoming appointments  LAST OFFICE VISIT - THIS DEPT  6/18/2024 Nahun Chamorro APRN    Last lab: 12/12/2023

## 2024-12-27 RX ORDER — AMLODIPINE BESYLATE 10 MG/1
10 TABLET ORAL DAILY
Qty: 90 TABLET | Refills: 0 | Status: SHIPPED | OUTPATIENT
Start: 2024-12-27

## 2024-12-27 RX ORDER — PRAVASTATIN SODIUM 40 MG
40 TABLET ORAL DAILY
Qty: 90 TABLET | Refills: 0 | Status: SHIPPED | OUTPATIENT
Start: 2024-12-27

## 2024-12-27 RX ORDER — EZETIMIBE 10 MG/1
10 TABLET ORAL DAILY
Qty: 90 TABLET | Refills: 0 | Status: SHIPPED | OUTPATIENT
Start: 2024-12-27

## 2025-01-01 DIAGNOSIS — I10 PRIMARY HYPERTENSION: ICD-10-CM

## 2025-01-02 RX ORDER — LOSARTAN POTASSIUM 100 MG/1
100 TABLET ORAL DAILY
Qty: 90 TABLET | Refills: 0 | Status: SHIPPED | OUTPATIENT
Start: 2025-01-02

## 2025-01-15 DIAGNOSIS — R11.0 NAUSEA: ICD-10-CM

## 2025-01-15 RX ORDER — ONDANSETRON 8 MG/1
8 TABLET, FILM COATED ORAL EVERY 8 HOURS PRN
Qty: 30 TABLET | Refills: 0 | Status: SHIPPED | OUTPATIENT
Start: 2025-01-15

## 2025-02-12 DIAGNOSIS — R11.0 NAUSEA: ICD-10-CM

## 2025-02-12 DIAGNOSIS — I82.4Y2 ACUTE DEEP VEIN THROMBOSIS (DVT) OF PROXIMAL VEIN OF LEFT LOWER EXTREMITY: ICD-10-CM

## 2025-02-12 RX ORDER — ONDANSETRON 8 MG/1
8 TABLET, FILM COATED ORAL EVERY 8 HOURS PRN
Qty: 30 TABLET | Refills: 0 | Status: SHIPPED | OUTPATIENT
Start: 2025-02-12

## 2025-02-12 RX ORDER — APIXABAN 5 MG/1
5 TABLET, FILM COATED ORAL EVERY 12 HOURS SCHEDULED
Qty: 180 TABLET | Refills: 0 | Status: SHIPPED | OUTPATIENT
Start: 2025-02-12

## 2025-03-05 RX ORDER — BUDESONIDE AND FORMOTEROL FUMARATE DIHYDRATE 160; 4.5 UG/1; UG/1
2 AEROSOL RESPIRATORY (INHALATION)
Qty: 10.2 EACH | Refills: 5 | Status: SHIPPED | OUTPATIENT
Start: 2025-03-05

## 2025-03-12 DIAGNOSIS — R11.0 NAUSEA: ICD-10-CM

## 2025-03-12 RX ORDER — ONDANSETRON 8 MG/1
8 TABLET, FILM COATED ORAL EVERY 8 HOURS PRN
Qty: 30 TABLET | Refills: 0 | Status: SHIPPED | OUTPATIENT
Start: 2025-03-12

## 2025-03-26 DIAGNOSIS — I10 PRIMARY HYPERTENSION: ICD-10-CM

## 2025-03-26 DIAGNOSIS — E78.5 HYPERLIPIDEMIA, UNSPECIFIED HYPERLIPIDEMIA TYPE: ICD-10-CM

## 2025-03-26 RX ORDER — AMLODIPINE BESYLATE 10 MG/1
10 TABLET ORAL DAILY
Qty: 90 TABLET | Refills: 0 | Status: SHIPPED | OUTPATIENT
Start: 2025-03-26

## 2025-03-26 RX ORDER — PRAVASTATIN SODIUM 40 MG
40 TABLET ORAL DAILY
Qty: 90 TABLET | Refills: 0 | Status: SHIPPED | OUTPATIENT
Start: 2025-03-26

## 2025-03-26 RX ORDER — EZETIMIBE 10 MG/1
10 TABLET ORAL DAILY
Qty: 90 TABLET | Refills: 0 | Status: SHIPPED | OUTPATIENT
Start: 2025-03-26

## 2025-03-26 NOTE — TELEPHONE ENCOUNTER
Upcoming Appts  No upcoming appointments  Last Office Visit - This Dept  6/18/2024 Nahun Chamorro, APRN

## 2025-03-27 DIAGNOSIS — I10 PRIMARY HYPERTENSION: ICD-10-CM

## 2025-03-27 DIAGNOSIS — E78.5 HYPERLIPIDEMIA, UNSPECIFIED HYPERLIPIDEMIA TYPE: ICD-10-CM

## 2025-03-27 RX ORDER — AMLODIPINE BESYLATE 10 MG/1
10 TABLET ORAL DAILY
Qty: 90 TABLET | Refills: 0 | OUTPATIENT
Start: 2025-03-27

## 2025-03-27 RX ORDER — EZETIMIBE 10 MG/1
10 TABLET ORAL DAILY
Qty: 90 TABLET | Refills: 0 | OUTPATIENT
Start: 2025-03-27

## 2025-03-27 RX ORDER — PRAVASTATIN SODIUM 40 MG
40 TABLET ORAL DAILY
Qty: 90 TABLET | Refills: 0 | OUTPATIENT
Start: 2025-03-27

## 2025-03-30 DIAGNOSIS — I10 PRIMARY HYPERTENSION: ICD-10-CM

## 2025-03-31 RX ORDER — LOSARTAN POTASSIUM 100 MG/1
100 TABLET ORAL DAILY
Qty: 90 TABLET | Refills: 0 | Status: SHIPPED | OUTPATIENT
Start: 2025-03-31

## 2025-04-04 DIAGNOSIS — R11.0 NAUSEA: ICD-10-CM

## 2025-04-07 RX ORDER — ONDANSETRON 8 MG/1
8 TABLET, FILM COATED ORAL EVERY 8 HOURS PRN
Qty: 30 TABLET | Refills: 0 | Status: SHIPPED | OUTPATIENT
Start: 2025-04-07

## 2025-04-16 RX ORDER — CARVEDILOL 6.25 MG/1
6.25 TABLET ORAL 2 TIMES DAILY WITH MEALS
Qty: 180 TABLET | Refills: 1 | Status: SHIPPED | OUTPATIENT
Start: 2025-04-16 | End: 2025-04-16

## 2025-04-16 RX ORDER — CARVEDILOL 12.5 MG/1
12.5 TABLET ORAL 2 TIMES DAILY WITH MEALS
Qty: 180 TABLET | Refills: 1 | Status: SHIPPED | OUTPATIENT
Start: 2025-04-16

## 2025-04-22 DIAGNOSIS — R11.0 NAUSEA: ICD-10-CM

## 2025-04-22 RX ORDER — ONDANSETRON 8 MG/1
8 TABLET, FILM COATED ORAL EVERY 8 HOURS PRN
Qty: 30 TABLET | Refills: 1 | Status: SHIPPED | OUTPATIENT
Start: 2025-04-22

## 2025-05-05 ENCOUNTER — OFFICE VISIT (OUTPATIENT)
Dept: FAMILY MEDICINE CLINIC | Facility: CLINIC | Age: 75
End: 2025-05-05
Payer: COMMERCIAL

## 2025-05-05 VITALS
DIASTOLIC BLOOD PRESSURE: 66 MMHG | HEIGHT: 69 IN | BODY MASS INDEX: 15.08 KG/M2 | SYSTOLIC BLOOD PRESSURE: 144 MMHG | OXYGEN SATURATION: 98 % | HEART RATE: 70 BPM | TEMPERATURE: 97.9 F | WEIGHT: 101.8 LBS

## 2025-05-05 DIAGNOSIS — I10 PRIMARY HYPERTENSION: Primary | ICD-10-CM

## 2025-05-05 DIAGNOSIS — R25.2 MUSCLE CRAMPS: ICD-10-CM

## 2025-05-05 DIAGNOSIS — E78.5 HYPERLIPIDEMIA, UNSPECIFIED HYPERLIPIDEMIA TYPE: ICD-10-CM

## 2025-05-05 DIAGNOSIS — J44.9 CHRONIC OBSTRUCTIVE PULMONARY DISEASE, UNSPECIFIED COPD TYPE: ICD-10-CM

## 2025-05-05 PROCEDURE — 99214 OFFICE O/P EST MOD 30 MIN: CPT | Performed by: NURSE PRACTITIONER

## 2025-05-05 RX ORDER — FLUTICASONE FUROATE, UMECLIDINIUM BROMIDE AND VILANTEROL TRIFENATATE 100; 62.5; 25 UG/1; UG/1; UG/1
1 POWDER RESPIRATORY (INHALATION)
Qty: 60 EACH | Refills: 5 | Status: SHIPPED | OUTPATIENT
Start: 2025-05-05

## 2025-05-05 NOTE — PROGRESS NOTES
"Chief Complaint  Fatigue, Shortness of Breath, Extremity Weakness, and Leg Pain    Subjective         Dorie Head presents to [facility]  Patient presents to the office with concerns over overall general fatigue shortness of breath and decreased stamina.  She states that she is becoming very short of breath with physical activity.  Patient does continue to smoke.  She is not currently using any inhalers for her COPD.  I did discuss utilizing Trelegy and a sample will be given to the patient.  I did ask her to monitor her blood pressure while using this in conjunction with carvedilol.  I also explained that she is due for lab work.  She states that she will get this done this week.  She also states that she has been having bilateral leg cramping especially at night when trying to sleep.  I explained that we would further evaluate this with her lab work as well.    Fatigue  Symptoms include fatigue.    Shortness of Breath  Associated symptoms: leg pain    Extremity Weakness  Leg Pain          Objective     Vitals:    05/05/25 1313   BP: 144/66   BP Location: Right arm   Patient Position: Sitting   Cuff Size: Adult   Pulse: 70   Temp: 97.9 °F (36.6 °C)   TempSrc: Temporal   SpO2: 98%   Weight: 46.2 kg (101 lb 12.8 oz)   Height: 175.3 cm (69.02\")      Body mass index is 15.03 kg/m².    BMI is below normal parameters (malnutrition). Recommendations: none (medical contraindication)        Physical Exam  Vitals reviewed.   Constitutional:       Appearance: Normal appearance.   Cardiovascular:      Rate and Rhythm: Normal rate and regular rhythm.      Pulses: Normal pulses.      Heart sounds: Normal heart sounds, S1 normal and S2 normal. No murmur heard.  Pulmonary:      Effort: Pulmonary effort is normal. No respiratory distress.      Breath sounds: Normal breath sounds.   Skin:     General: Skin is warm and dry.   Neurological:      Mental Status: She is alert and oriented to person, " place, and time.   Psychiatric:         Attention and Perception: Attention normal.         Mood and Affect: Mood normal.         Behavior: Behavior normal.          Result Review :   The following data was reviewed by: ANNE Suero on 05/05/2025:      Procedures    Assessment and Plan   Diagnoses and all orders for this visit:    1. Primary hypertension (Primary)  -     CBC (No Diff); Future  -     Comprehensive Metabolic Panel; Future    2. Hyperlipidemia, unspecified hyperlipidemia type  -     Lipid Panel; Future    3. Muscle cramps  -     Magnesium; Future    4. Chronic obstructive pulmonary disease, unspecified COPD type  -     Fluticasone-Umeclidin-Vilant (Trelegy Ellipta) 100-62.5-25 MCG/ACT inhaler; Inhale 1 puff Daily.  Dispense: 60 each; Refill: 5      Patient also has short-term disability paperwork that needs to be completed.  I explained to the patient that this would be completed by the end of the week and faxed.  She will be able to  original copies by Friday.  Patient first day of missed work was May 1.    Follow Up   Return in about 6 months (around 11/5/2025), or if symptoms worsen or fail to improve.  Patient was given instructions and counseling regarding her condition or for health maintenance advice. Please see specific information pulled into the AVS if appropriate.

## 2025-05-06 ENCOUNTER — TELEPHONE (OUTPATIENT)
Dept: FAMILY MEDICINE CLINIC | Facility: CLINIC | Age: 75
End: 2025-05-06
Payer: COMMERCIAL

## 2025-05-06 NOTE — TELEPHONE ENCOUNTER
HUB TO RELAY GIVEN DIRECTLY TO PATIENT WHO STATES UNDERSTANDING AND HAS NO FURTHER QUESTIONS.    PATIENT STATES THE FAX NUMBER SHOULD BE ON THE FORMS AND ASKED IT BE FAXED FOR HER [phone].     PATIENT WILL COME IN THURSDAY TO PICK THE COPIES

## 2025-05-08 ENCOUNTER — LAB (OUTPATIENT)
Dept: LAB | Facility: HOSPITAL | Age: 75
End: 2025-05-08
Payer: COMMERCIAL

## 2025-05-08 DIAGNOSIS — E78.5 HYPERLIPIDEMIA, UNSPECIFIED HYPERLIPIDEMIA TYPE: ICD-10-CM

## 2025-05-08 DIAGNOSIS — I10 PRIMARY HYPERTENSION: ICD-10-CM

## 2025-05-08 DIAGNOSIS — R25.2 MUSCLE CRAMPS: ICD-10-CM

## 2025-05-08 LAB
ALBUMIN SERPL-MCNC: 3.9 G/DL (ref 3.5–5.2)
ALBUMIN/GLOB SERPL: 1.3 G/DL
ALP SERPL-CCNC: 99 U/L (ref 39–117)
ALT SERPL W P-5'-P-CCNC: 6 U/L (ref 1–33)
ANION GAP SERPL CALCULATED.3IONS-SCNC: 8.9 MMOL/L (ref 5–15)
AST SERPL-CCNC: 17 U/L (ref 1–32)
BILIRUB SERPL-MCNC: 0.2 MG/DL (ref 0–1.2)
BUN SERPL-MCNC: 10 MG/DL (ref 8–23)
BUN/CREAT SERPL: 9.4 (ref 7–25)
CALCIUM SPEC-SCNC: 9.3 MG/DL (ref 8.6–10.5)
CHLORIDE SERPL-SCNC: 99 MMOL/L (ref 98–107)
CHOLEST SERPL-MCNC: 116 MG/DL (ref 0–200)
CO2 SERPL-SCNC: 27.1 MMOL/L (ref 22–29)
CREAT SERPL-MCNC: 1.06 MG/DL (ref 0.57–1)
DEPRECATED RDW RBC AUTO: 43.9 FL (ref 37–54)
EGFRCR SERPLBLD CKD-EPI 2021: 55.9 ML/MIN/1.73
ERYTHROCYTE [DISTWIDTH] IN BLOOD BY AUTOMATED COUNT: 14.3 % (ref 12.3–15.4)
GLOBULIN UR ELPH-MCNC: 3 GM/DL
GLUCOSE SERPL-MCNC: 81 MG/DL (ref 65–99)
HCT VFR BLD AUTO: 26 % (ref 34–46.6)
HDLC SERPL-MCNC: 49 MG/DL (ref 40–60)
HGB BLD-MCNC: 7.9 G/DL (ref 12–15.9)
LDLC SERPL CALC-MCNC: 53 MG/DL (ref 0–100)
LDLC/HDLC SERPL: 1.09 {RATIO}
MAGNESIUM SERPL-MCNC: 2 MG/DL (ref 1.6–2.4)
MCH RBC QN AUTO: 26.1 PG (ref 26.6–33)
MCHC RBC AUTO-ENTMCNC: 30.4 G/DL (ref 31.5–35.7)
MCV RBC AUTO: 85.8 FL (ref 79–97)
PLATELET # BLD AUTO: 287 10*3/MM3 (ref 140–450)
PMV BLD AUTO: 10 FL (ref 6–12)
POTASSIUM SERPL-SCNC: 4.3 MMOL/L (ref 3.5–5.2)
PROT SERPL-MCNC: 6.9 G/DL (ref 6–8.5)
RBC # BLD AUTO: 3.03 10*6/MM3 (ref 3.77–5.28)
SODIUM SERPL-SCNC: 135 MMOL/L (ref 136–145)
TRIGL SERPL-MCNC: 68 MG/DL (ref 0–150)
VLDLC SERPL-MCNC: 14 MG/DL (ref 5–40)
WBC NRBC COR # BLD AUTO: 7.28 10*3/MM3 (ref 3.4–10.8)

## 2025-05-08 PROCEDURE — 80061 LIPID PANEL: CPT

## 2025-05-08 PROCEDURE — 36415 COLL VENOUS BLD VENIPUNCTURE: CPT

## 2025-05-08 PROCEDURE — 80053 COMPREHEN METABOLIC PANEL: CPT

## 2025-05-08 PROCEDURE — 85027 COMPLETE CBC AUTOMATED: CPT

## 2025-05-08 PROCEDURE — 83735 ASSAY OF MAGNESIUM: CPT

## 2025-05-09 DIAGNOSIS — D64.9 ANEMIA, UNSPECIFIED TYPE: Primary | ICD-10-CM

## 2025-05-15 ENCOUNTER — LAB (OUTPATIENT)
Dept: LAB | Facility: HOSPITAL | Age: 75
End: 2025-05-15
Payer: COMMERCIAL

## 2025-05-15 DIAGNOSIS — D64.9 ANEMIA, UNSPECIFIED TYPE: ICD-10-CM

## 2025-05-15 LAB
DEPRECATED RDW RBC AUTO: 47.4 FL (ref 37–54)
ERYTHROCYTE [DISTWIDTH] IN BLOOD BY AUTOMATED COUNT: 15 % (ref 12.3–15.4)
HCT VFR BLD AUTO: 25.1 % (ref 34–46.6)
HGB BLD-MCNC: 7.7 G/DL (ref 12–15.9)
IRON 24H UR-MRATE: 29 MCG/DL (ref 37–145)
IRON SATN MFR SERPL: 5 % (ref 20–50)
MCH RBC QN AUTO: 27.2 PG (ref 26.6–33)
MCHC RBC AUTO-ENTMCNC: 30.7 G/DL (ref 31.5–35.7)
MCV RBC AUTO: 88.7 FL (ref 79–97)
PLATELET # BLD AUTO: 247 10*3/MM3 (ref 140–450)
PMV BLD AUTO: 9.9 FL (ref 6–12)
RBC # BLD AUTO: 2.83 10*6/MM3 (ref 3.77–5.28)
TIBC SERPL-MCNC: 559 MCG/DL (ref 298–536)
TRANSFERRIN SERPL-MCNC: 375 MG/DL (ref 200–360)
WBC NRBC COR # BLD AUTO: 7.63 10*3/MM3 (ref 3.4–10.8)

## 2025-05-15 PROCEDURE — 36415 COLL VENOUS BLD VENIPUNCTURE: CPT

## 2025-05-15 PROCEDURE — 85027 COMPLETE CBC AUTOMATED: CPT

## 2025-05-15 PROCEDURE — 83540 ASSAY OF IRON: CPT

## 2025-05-15 PROCEDURE — 84466 ASSAY OF TRANSFERRIN: CPT

## 2025-05-20 RX ORDER — FERROUS SULFATE 325(65) MG
325 TABLET ORAL
Qty: 90 TABLET | Refills: 1 | Status: SHIPPED | OUTPATIENT
Start: 2025-05-20

## 2025-05-21 DIAGNOSIS — R11.0 NAUSEA: ICD-10-CM

## 2025-05-21 RX ORDER — ONDANSETRON 8 MG/1
8 TABLET, FILM COATED ORAL EVERY 8 HOURS PRN
Qty: 30 TABLET | Refills: 2 | Status: SHIPPED | OUTPATIENT
Start: 2025-05-21

## 2025-05-29 DIAGNOSIS — I82.4Y2 ACUTE DEEP VEIN THROMBOSIS (DVT) OF PROXIMAL VEIN OF LEFT LOWER EXTREMITY: ICD-10-CM

## 2025-05-29 RX ORDER — APIXABAN 5 MG/1
5 TABLET, FILM COATED ORAL EVERY 12 HOURS SCHEDULED
Qty: 180 TABLET | Refills: 0 | Status: SHIPPED | OUTPATIENT
Start: 2025-05-29

## 2025-06-21 DIAGNOSIS — I10 PRIMARY HYPERTENSION: ICD-10-CM

## 2025-06-21 DIAGNOSIS — E78.5 HYPERLIPIDEMIA, UNSPECIFIED HYPERLIPIDEMIA TYPE: ICD-10-CM

## 2025-06-23 RX ORDER — AMLODIPINE BESYLATE 10 MG/1
10 TABLET ORAL DAILY
Qty: 90 TABLET | Refills: 0 | Status: SHIPPED | OUTPATIENT
Start: 2025-06-23

## 2025-06-23 RX ORDER — PRAVASTATIN SODIUM 40 MG
40 TABLET ORAL DAILY
Qty: 90 TABLET | Refills: 0 | Status: SHIPPED | OUTPATIENT
Start: 2025-06-23

## 2025-06-23 RX ORDER — EZETIMIBE 10 MG/1
10 TABLET ORAL DAILY
Qty: 90 TABLET | Refills: 0 | Status: SHIPPED | OUTPATIENT
Start: 2025-06-23

## 2025-06-25 DIAGNOSIS — I10 PRIMARY HYPERTENSION: ICD-10-CM

## 2025-06-25 RX ORDER — LOSARTAN POTASSIUM 100 MG/1
100 TABLET ORAL DAILY
Qty: 90 TABLET | Refills: 0 | Status: SHIPPED | OUTPATIENT
Start: 2025-06-25

## 2025-06-25 NOTE — TELEPHONE ENCOUNTER
Upcoming Appts  No upcoming appointments  Last Office Visit - This Dept  5/5/2025 Nahun Chamorro, APRN

## 2025-06-30 DIAGNOSIS — R11.0 NAUSEA: ICD-10-CM

## 2025-06-30 RX ORDER — ONDANSETRON 8 MG/1
8 TABLET, FILM COATED ORAL EVERY 8 HOURS PRN
Qty: 30 TABLET | Refills: 2 | Status: SHIPPED | OUTPATIENT
Start: 2025-06-30

## 2025-06-30 NOTE — TELEPHONE ENCOUNTER
Per [employer] message.    Could you please call in a prescription for odansetron, I only have a couple left, and could you please make it refillable, that would make it so much easier for me

## 2025-07-24 ENCOUNTER — PATIENT MESSAGE (OUTPATIENT)
Dept: FAMILY MEDICINE CLINIC | Facility: CLINIC | Age: 75
End: 2025-07-24
Payer: COMMERCIAL

## 2025-07-24 ENCOUNTER — E-VISIT (OUTPATIENT)
Dept: ADMINISTRATIVE | Facility: OTHER | Age: 75
End: 2025-07-24
Payer: COMMERCIAL

## 2025-07-24 NOTE — E-VISIT ESCALATED
Status: Referred Out  Date: 2025 07:12:11  Acuity Level: Not applicable  Referral message: Based on the information you provided during your interview, eVisit is not appropriate for treating your condition.  Patient: Dorie Head  Patient : 1950  Patient Address: [address] [place] [address]  Patient Phone: [phone]  Clinician Response: Unavailable  Diagnosis: Unavailable  Diagnosis ICD: Unavailable     Patient Interview Questions and Responses: None available

## 2025-07-24 NOTE — E-VISIT ESCALATED
Status: Referred Out  Date: 2025 07:16:36  Acuity Level: Not applicable  Referral message: Based on the information you provided during your interview, eVisit is not appropriate for treating your condition.  Patient: Dorie Head  Patient : 1950  Patient Address: [address] [place] [address]  Patient Phone: [phone]  Clinician Response: Unavailable  Diagnosis: Unavailable  Diagnosis ICD: Unavailable     Patient Interview Questions and Responses: None available

## 2025-07-28 ENCOUNTER — TELEMEDICINE (OUTPATIENT)
Dept: FAMILY MEDICINE CLINIC | Facility: CLINIC | Age: 75
End: 2025-07-28
Payer: COMMERCIAL

## 2025-07-28 DIAGNOSIS — R53.1 WEAKNESS: ICD-10-CM

## 2025-07-28 DIAGNOSIS — J44.9 CHRONIC OBSTRUCTIVE PULMONARY DISEASE, UNSPECIFIED COPD TYPE: ICD-10-CM

## 2025-07-28 DIAGNOSIS — R53.83 FATIGUE, UNSPECIFIED TYPE: Primary | ICD-10-CM

## 2025-07-28 PROCEDURE — 99213 OFFICE O/P EST LOW 20 MIN: CPT | Performed by: NURSE PRACTITIONER

## 2025-07-28 NOTE — PROGRESS NOTES
Chief Complaint  Fatigue, weakness, loss of appetite     Subjective         Dorie Head presents to [facility]  Telehealth visit completed for the patient.  Consent obtained.  Patient states that she has had increased fatigue well as generalized weakness.  She states that she is having a difficult time moving about her house and unable to leave.  Patient also states that her appetite has decreased significantly.  She states that her shortness of breath has increased in she is using her home O2 more frequently.  She states that she feels that she has to lay in the bed throughout the day with her oxygen on..  I did explain to the patient that we would obtain lab work.  I also explained that I would refer her to home health for further evaluation and allow them to obtain the labs.  Patient does have concerns regarding her disability paperwork associated with her work.  She did ask for an extension on this.         Objective     There were no vitals filed for this visit.   There is no height or weight on file to calculate BMI.             Physical Exam  Constitutional:       Appearance: Normal appearance.   HENT:      Right Ear: Decreased hearing noted.      Left Ear: Decreased hearing noted.   Cardiovascular:      Heart sounds: S1 normal and S2 normal. No murmur heard.  Pulmonary:      Effort: Pulmonary effort is normal.   Skin:     General: Skin is warm and dry.   Neurological:      General: No focal deficit present.      Mental Status: She is alert and oriented to person, place, and time.   Psychiatric:         Attention and Perception: Attention normal.         Mood and Affect: Mood normal.         Behavior: Behavior normal.          Result Review :   The following data was reviewed by: ANNE Suero on 07/28/2025:      Procedures    Assessment and Plan   Diagnoses and all orders for this visit:    1. Fatigue, unspecified type (Primary)  -     CBC (No Diff); Future  -      Comprehensive Metabolic Panel; Future  -     TSH; Future  -     Iron and TIBC; Future  -     Ambulatory Referral to Home Health    2. Weakness  -     CBC (No Diff); Future  -     Comprehensive Metabolic Panel; Future  -     TSH; Future  -     Iron and TIBC; Future  -     Ambulatory Referral to Home Health    3. Chronic obstructive pulmonary disease, unspecified COPD type  Comments:  Continue home O2 as needed as directed vis N/C          Follow Up   Return in about 6 months (around 1/28/2026).  Patient was given instructions and counseling regarding her condition or for health maintenance advice. Please see specific information pulled into the AVS if appropriate.

## 2025-08-04 ENCOUNTER — TELEPHONE (OUTPATIENT)
Dept: FAMILY MEDICINE CLINIC | Facility: CLINIC | Age: 75
End: 2025-08-04
Payer: COMMERCIAL

## 2025-08-08 ENCOUNTER — TELEPHONE (OUTPATIENT)
Dept: FAMILY MEDICINE CLINIC | Facility: CLINIC | Age: 75
End: 2025-08-08
Payer: COMMERCIAL

## 2025-08-12 DIAGNOSIS — R11.0 NAUSEA: ICD-10-CM

## 2025-08-12 RX ORDER — ONDANSETRON 8 MG/1
8 TABLET, FILM COATED ORAL EVERY 8 HOURS PRN
Qty: 90 TABLET | Refills: 1 | Status: SHIPPED | OUTPATIENT
Start: 2025-08-12